# Patient Record
Sex: MALE | Race: OTHER | Employment: FULL TIME | URBAN - METROPOLITAN AREA
[De-identification: names, ages, dates, MRNs, and addresses within clinical notes are randomized per-mention and may not be internally consistent; named-entity substitution may affect disease eponyms.]

---

## 2020-08-12 ENCOUNTER — HOSPITAL ENCOUNTER (EMERGENCY)
Age: 60
Discharge: HOME | End: 2020-08-13
Attending: EMERGENCY MEDICINE
Payer: COMMERCIAL

## 2020-08-12 ENCOUNTER — APPOINTMENT (OUTPATIENT)
Dept: GENERAL RADIOLOGY | Age: 60
End: 2020-08-12
Payer: COMMERCIAL

## 2020-08-12 LAB
ALBUMIN SERPL-MCNC: 4.1 G/DL (ref 3.5–4.6)
ALP BLD-CCNC: 70 U/L (ref 35–104)
ALT SERPL-CCNC: 27 U/L (ref 0–41)
ANION GAP SERPL CALCULATED.3IONS-SCNC: 10 MEQ/L (ref 9–15)
APTT: 35.4 SEC (ref 24.4–36.8)
AST SERPL-CCNC: 21 U/L (ref 0–40)
BASOPHILS ABSOLUTE: 0 K/UL (ref 0–0.2)
BASOPHILS RELATIVE PERCENT: 0.6 %
BILIRUB SERPL-MCNC: 0.6 MG/DL (ref 0.2–0.7)
BILIRUBIN URINE: NEGATIVE
BLOOD, URINE: NEGATIVE
BUN BLDV-MCNC: 14 MG/DL (ref 8–23)
CALCIUM SERPL-MCNC: 8.5 MG/DL (ref 8.5–9.9)
CHLORIDE BLD-SCNC: 103 MEQ/L (ref 95–107)
CLARITY: CLEAR
CO2: 25 MEQ/L (ref 20–31)
COLOR: YELLOW
CREAT SERPL-MCNC: 1.03 MG/DL (ref 0.7–1.2)
D DIMER: 0.28 MG/L FEU (ref 0–0.5)
EKG ATRIAL RATE: 87 BPM
EKG P AXIS: 69 DEGREES
EKG P-R INTERVAL: 178 MS
EKG Q-T INTERVAL: 366 MS
EKG QRS DURATION: 86 MS
EKG QTC CALCULATION (BAZETT): 440 MS
EKG R AXIS: 2 DEGREES
EKG T AXIS: 6 DEGREES
EKG VENTRICULAR RATE: 87 BPM
EOSINOPHILS ABSOLUTE: 0.1 K/UL (ref 0–0.7)
EOSINOPHILS RELATIVE PERCENT: 1.9 %
GFR AFRICAN AMERICAN: >60
GFR NON-AFRICAN AMERICAN: >60
GLOBULIN: 2.6 G/DL (ref 2.3–3.5)
GLUCOSE BLD-MCNC: 110 MG/DL (ref 70–99)
GLUCOSE URINE: NEGATIVE MG/DL
HCT VFR BLD CALC: 41.1 % (ref 42–52)
HEMOGLOBIN: 14.3 G/DL (ref 14–18)
INR BLD: 1.1
KETONES, URINE: NEGATIVE MG/DL
LEUKOCYTE ESTERASE, URINE: NEGATIVE
LIPASE: 19 U/L (ref 12–95)
LYMPHOCYTES ABSOLUTE: 1.6 K/UL (ref 1–4.8)
LYMPHOCYTES RELATIVE PERCENT: 31.9 %
MCH RBC QN AUTO: 30.8 PG (ref 27–31.3)
MCHC RBC AUTO-ENTMCNC: 34.7 % (ref 33–37)
MCV RBC AUTO: 88.8 FL (ref 80–100)
MONOCYTES ABSOLUTE: 0.4 K/UL (ref 0.2–0.8)
MONOCYTES RELATIVE PERCENT: 8.7 %
NEUTROPHILS ABSOLUTE: 2.9 K/UL (ref 1.4–6.5)
NEUTROPHILS RELATIVE PERCENT: 56.9 %
NITRITE, URINE: NEGATIVE
PDW BLD-RTO: 13.1 % (ref 11.5–14.5)
PH UA: 7 (ref 5–9)
PLATELET # BLD: 239 K/UL (ref 130–400)
POTASSIUM REFLEX MAGNESIUM: 4.1 MEQ/L (ref 3.4–4.9)
PRO-BNP: 10 PG/ML
PROTEIN UA: NEGATIVE MG/DL
PROTHROMBIN TIME: 14 SEC (ref 12.3–14.9)
RBC # BLD: 4.62 M/UL (ref 4.7–6.1)
SODIUM BLD-SCNC: 138 MEQ/L (ref 135–144)
SPECIFIC GRAVITY UA: 1.01 (ref 1–1.03)
TOTAL PROTEIN: 6.7 G/DL (ref 6.3–8)
TROPONIN: <0.01 NG/ML (ref 0–0.01)
UROBILINOGEN, URINE: 0.2 E.U./DL
WBC # BLD: 5.1 K/UL (ref 4.8–10.8)

## 2020-08-12 PROCEDURE — 93005 ELECTROCARDIOGRAM TRACING: CPT | Performed by: EMERGENCY MEDICINE

## 2020-08-12 PROCEDURE — 81003 URINALYSIS AUTO W/O SCOPE: CPT

## 2020-08-12 PROCEDURE — 99285 EMERGENCY DEPT VISIT HI MDM: CPT

## 2020-08-12 PROCEDURE — 84484 ASSAY OF TROPONIN QUANT: CPT

## 2020-08-12 PROCEDURE — 2580000003 HC RX 258: Performed by: EMERGENCY MEDICINE

## 2020-08-12 PROCEDURE — 80053 COMPREHEN METABOLIC PANEL: CPT

## 2020-08-12 PROCEDURE — 83880 ASSAY OF NATRIURETIC PEPTIDE: CPT

## 2020-08-12 PROCEDURE — 85730 THROMBOPLASTIN TIME PARTIAL: CPT

## 2020-08-12 PROCEDURE — 85379 FIBRIN DEGRADATION QUANT: CPT

## 2020-08-12 PROCEDURE — 83690 ASSAY OF LIPASE: CPT

## 2020-08-12 PROCEDURE — 85025 COMPLETE CBC W/AUTO DIFF WBC: CPT

## 2020-08-12 PROCEDURE — 6370000000 HC RX 637 (ALT 250 FOR IP): Performed by: EMERGENCY MEDICINE

## 2020-08-12 PROCEDURE — 71045 X-RAY EXAM CHEST 1 VIEW: CPT

## 2020-08-12 PROCEDURE — 36415 COLL VENOUS BLD VENIPUNCTURE: CPT

## 2020-08-12 PROCEDURE — 85610 PROTHROMBIN TIME: CPT

## 2020-08-12 RX ORDER — 0.9 % SODIUM CHLORIDE 0.9 %
1000 INTRAVENOUS SOLUTION INTRAVENOUS ONCE
Status: COMPLETED | OUTPATIENT
Start: 2020-08-12 | End: 2020-08-12

## 2020-08-12 RX ORDER — NITROGLYCERIN 0.4 MG/1
0.4 TABLET SUBLINGUAL EVERY 5 MIN PRN
Status: DISCONTINUED | OUTPATIENT
Start: 2020-08-12 | End: 2020-08-13 | Stop reason: HOSPADM

## 2020-08-12 RX ORDER — ASPIRIN 81 MG/1
324 TABLET, CHEWABLE ORAL ONCE
Status: COMPLETED | OUTPATIENT
Start: 2020-08-12 | End: 2020-08-12

## 2020-08-12 RX ADMIN — SODIUM CHLORIDE 1000 ML: 9 INJECTION, SOLUTION INTRAVENOUS at 21:12

## 2020-08-12 RX ADMIN — ASPIRIN 324 MG: 81 TABLET, CHEWABLE ORAL at 21:12

## 2020-08-12 ASSESSMENT — PAIN DESCRIPTION - PAIN TYPE: TYPE: ACUTE PAIN

## 2020-08-12 ASSESSMENT — PAIN SCALES - GENERAL: PAINLEVEL_OUTOF10: 4

## 2020-08-13 VITALS
RESPIRATION RATE: 20 BRPM | HEART RATE: 64 BPM | TEMPERATURE: 98.3 F | WEIGHT: 195 LBS | DIASTOLIC BLOOD PRESSURE: 88 MMHG | BODY MASS INDEX: 30.61 KG/M2 | OXYGEN SATURATION: 98 % | SYSTOLIC BLOOD PRESSURE: 133 MMHG | HEIGHT: 67 IN

## 2020-08-13 LAB — TROPONIN: <0.01 NG/ML (ref 0–0.01)

## 2020-08-13 PROCEDURE — 93010 ELECTROCARDIOGRAM REPORT: CPT | Performed by: INTERNAL MEDICINE

## 2020-08-13 NOTE — ED TRIAGE NOTES
Patient states that a couple hours ago when he was sitting on the couch he felt discomfort in his left arm. He states that it became numb and tingly. He decided to go for a walk and started to have some discomfort on his left side along with slight dizziness. He is alert and orientated x 4. Pink, warm and dry. Abc's are intact. VSS. Afebrile. Will continue to monitor.

## 2020-08-13 NOTE — ED NOTES
Patient denies chest pain at this time, sublingual nitroglycerin not administered.       Raffaele Pascal RN  08/12/20 [id number]

## 2020-08-15 NOTE — ED PROVIDER NOTES
eMERGENCY dEPARTMENT eNCOUnter      [facility]    Chief Complaint   Patient presents with    Dizziness     left arm dizziness, left chest discomfort       HPI    Betsy Quinn is a 56 y.o. male who presentsto ED from home  By private car  With complaint of left side chest pain  Onset 6:30 PM  Intensity of symptoms mild  Location of symptoms left-sided chest,\" burning pressure\" lasted for few minutes with no aggravating or relieving factors. Patient also complains of lightheadedness, dizziness, headache. Patient has a history of heartburn and says it feels like the same. Patient denies any shortness of breath, cough, fever, chills. Patient denies any  recent contacts positive with corona virus or recent travel outside of [employer]. PAST MEDICAL HISTORY    Past Medical History:   Diagnosis Date    Hx of abdominal surgery      : Appendectomy (age 4)   [place] Hx of vasectomy        SURGICAL HISTORY    History reviewed. No pertinent surgical history. CURRENT MEDICATIONS    Current Outpatient Rx   Medication Sig Dispense Refill    omeprazole (PRILOSEC) 20 MG capsule Take 1 capsule by mouth Daily.  for stomach 60 capsule 2    omeprazole (PRILOSEC) 20 MG capsule TAKE 1 CAPSULE BY MOUTH DAILY FOR STOMACH 60 2       ALLERGIES    Allergies   Allergen Reactions    Latex Hives       FAMILY HISTORY    Family History   Problem Relation Age of Onset    Diabetes Sister        SOCIAL HISTORY    Social History     Socioeconomic History    Marital status:      Spouse name: None    Number of children: None    Years of education: None    Highest education level: None   Occupational History    None   Social Needs    Financial resource strain: None    Food insecurity     Worry: None     Inability: None    Transportation needs     Medical: None     Non-medical: None   Tobacco Use    Smoking status: Never Smoker    Smokeless tobacco: Never Used   Substance and Sexual Activity    Alcohol use: No    Drug use: No    Sexual activity: None   Lifestyle    Physical activity     Days per week: None     Minutes per session: None    Stress: None   Relationships    Social connections     Talks on phone: None     Gets together: None     Attends Yazidi service: None     Active member of club or organization: None     Attends meetings of clubs or organizations: None     Relationship status: None    Intimate partner violence     Fear of current or ex partner: None     Emotionally abused: None     Physically abused: None     Forced sexual activity: None   Other Topics Concern    None   Social History Narrative    None       REVIEW OF SYSTEMS    Constitutional:  Denies fever, chills, weight loss or weakness   Eyes:  Denies photophobia or discharge   HENT:  Denies sore throat or ear pain   Respiratory:  Denies cough or shortness of breath   Cardiovascular:  C/o  chest pain, but denies palpitations or swelling   GI:  Denies abdominal pain, nausea, vomiting, or diarrhea   Musculoskeletal:  Denies back pain   Skin:  Denies rash   Neurologic:  Denies  focal weakness or sensory changes but complains of lightheadedness  Endocrine:  Denies polyuria or polydypsia   Lymphatic:  Denies swollen glands   Psychiatric:  Denies depression, suicidal ideation or homicidal ideation   All systems negative except as marked. PHYSICAL EXAM    VITAL SIGNS: /88   Pulse 64   Temp 98.3 °F (36.8 °C) (Tympanic)   Resp 20   Ht 5' 7\" (1.702 m)   Wt 195 lb (88.5 kg)   SpO2 98%   BMI 30.54 kg/m²    Constitutional:  Well developed, Well nourished, No acute distress, Non-toxic appearance. HENT:  Normocephalic, Atraumatic, Bilateral external ears normal, Oropharynx moist, No oral exudates, Nose normal. Neck- Normal range of motion, No tenderness, Supple, No stridor. Eyes:  PERRL, EOMI, Conjunctiva normal, No discharge. Respiratory:  Normal breath sounds, No respiratory distress, No wheezing, No chest tenderness.    Cardiovascular: Normal heart rate, Normal rhythm, No murmurs, No rubs, No gallops. GI:  Bowel sounds normal, Soft, No tenderness, No masses, No pulsatile masses. :  No CVA tenderness. Musculoskeletal:  Intact distal pulses, No edema, No tenderness, No cyanosis, No clubbing. Good range of motion in all major joints. No tenderness to palpation or major deformities noted. Back- No tenderness. Integument:  Warm, Dry, No erythema, No rash. Lymphatic:  No lymphadenopathy noted. Neurologic:  Alert & oriented x 3, Normal motor function, Normal sensory function, No focal deficits noted. Psychiatric:  Affect normal, Judgment normal, Mood normal.     EKG    NSR, HR 87 , Normal Axis, No ST changes nonspecific T wave changes and 3, aVF , QTc 440, PAC    RADIOLOGY    XR CHEST PORTABLE   Final Result   Impression:  No radiographic evidence of acute cardiopulmonary disease             REEVALUATION   Patient was updated the results of labs and Radiology. Patient denies chest pain or shortness of breath. MDM: Patient had atypical presentation for chest pain. Patient's risk factors are minimal.  2 sets of troponin were negative.   Patient was discharged home with follow-up with cardiologist.        Jenni Ramirez Reviewed   CBC WITH AUTO DIFFERENTIAL - Abnormal; Notable for the following components:       Result Value    RBC 4.62 (*)     Hematocrit 41.1 (*)     All other components within normal limits   COMPREHENSIVE METABOLIC PANEL W/ REFLEX TO MG FOR LOW K - Abnormal; Notable for the following components:    Glucose 110 (*)     All other components within normal limits   TROPONIN   BRAIN NATRIURETIC PEPTIDE   PROTIME-INR   APTT   D-DIMER, QUANTITATIVE   URINALYSIS   LIPASE   TROPONIN             Summation      Patient Course:     ED Medications administered this visit:    Medications   0.9 % sodium chloride bolus (0 mLs Intravenous Stopped 8/12/20 2230)   aspirin chewable tablet 324 mg (324 mg Oral Given 8/12/20 2112)       New Prescriptions from this visit:    Discharge Medication List as of 8/13/2020 12:53 AM          Follow-up:  Faye Lobo, [address] [facility] [address]  [facility] [id number] [phone]    Call in 1 day      [address] [place] [address]  [address] [facility]     Call in 1 day          Final Impression:   1.  Chest pain, unspecified type               (Please note that portions of this note were completed with a voice recognition program.  Efforts were made to edit the dictations but occasionally words are mis-transcribed.)            Nickie Mesa MD  08/15/20 8687

## 2023-05-27 ENCOUNTER — HOSPITAL ENCOUNTER (EMERGENCY)
Age: 63
Discharge: HOME | End: 2023-05-27
Attending: EMERGENCY MEDICINE
Payer: COMMERCIAL

## 2023-05-27 ENCOUNTER — APPOINTMENT (OUTPATIENT)
Dept: GENERAL RADIOLOGY | Age: 63
End: 2023-05-27
Payer: COMMERCIAL

## 2023-05-27 VITALS
BODY MASS INDEX: 30.54 KG/M2 | RESPIRATION RATE: 17 BRPM | HEART RATE: 65 BPM | WEIGHT: 195 LBS | OXYGEN SATURATION: 97 % | SYSTOLIC BLOOD PRESSURE: 133 MMHG | DIASTOLIC BLOOD PRESSURE: 105 MMHG | TEMPERATURE: 97.4 F

## 2023-05-27 DIAGNOSIS — R07.89 ATYPICAL CHEST PAIN: Primary | ICD-10-CM

## 2023-05-27 LAB
ALBUMIN SERPL-MCNC: 4.2 G/DL (ref 3.5–4.6)
ALP SERPL-CCNC: 72 U/L (ref 35–104)
ALT SERPL-CCNC: 37 U/L (ref 0–41)
ANION GAP SERPL CALCULATED.3IONS-SCNC: 11 MEQ/L (ref 9–15)
AST SERPL-CCNC: 41 U/L (ref 0–40)
BASOPHILS # BLD: 0 K/UL (ref 0–0.2)
BASOPHILS NFR BLD: 0.5 %
BILIRUB SERPL-MCNC: 0.8 MG/DL (ref 0.2–0.7)
BUN SERPL-MCNC: 12 MG/DL (ref 8–23)
CALCIUM SERPL-MCNC: 8.7 MG/DL (ref 8.5–9.9)
CHLORIDE SERPL-SCNC: 103 MEQ/L (ref 95–107)
CO2 SERPL-SCNC: 20 MEQ/L (ref 20–31)
CREAT SERPL-MCNC: 1.05 MG/DL (ref 0.7–1.2)
EKG ATRIAL RATE: 67 BPM
EKG P AXIS: 43 DEGREES
EKG P-R INTERVAL: 178 MS
EKG Q-T INTERVAL: 386 MS
EKG QRS DURATION: 84 MS
EKG QTC CALCULATION (BAZETT): 407 MS
EKG R AXIS: -8 DEGREES
EKG T AXIS: -4 DEGREES
EKG VENTRICULAR RATE: 67 BPM
EOSINOPHIL # BLD: 0.1 K/UL (ref 0–0.7)
EOSINOPHIL NFR BLD: 2 %
ERYTHROCYTE [DISTWIDTH] IN BLOOD BY AUTOMATED COUNT: 13.3 % (ref 11.5–14.5)
GLOBULIN SER CALC-MCNC: 2.8 G/DL (ref 2.3–3.5)
GLUCOSE SERPL-MCNC: 93 MG/DL (ref 70–99)
HCT VFR BLD AUTO: 44.5 % (ref 42–52)
HGB BLD-MCNC: 15 G/DL (ref 14–18)
LYMPHOCYTES # BLD: 1.5 K/UL (ref 1–4.8)
LYMPHOCYTES NFR BLD: 30.3 %
MCH RBC QN AUTO: 29.8 PG (ref 27–31.3)
MCHC RBC AUTO-ENTMCNC: 33.8 % (ref 33–37)
MCV RBC AUTO: 88.3 FL (ref 79–92.2)
MONOCYTES # BLD: 0.5 K/UL (ref 0.2–0.8)
MONOCYTES NFR BLD: 9.7 %
NEUTROPHILS # BLD: 2.8 K/UL (ref 1.4–6.5)
NEUTS SEG NFR BLD: 57.5 %
PLATELET # BLD AUTO: 249 K/UL (ref 130–400)
POTASSIUM SERPL-SCNC: 5 MEQ/L (ref 3.4–4.9)
PROT SERPL-MCNC: 7 G/DL (ref 6.3–8)
RBC # BLD AUTO: 5.04 M/UL (ref 4.7–6.1)
SODIUM SERPL-SCNC: 134 MEQ/L (ref 135–144)
TROPONIN T SERPL-MCNC: <0.01 NG/ML (ref 0–0.01)
WBC # BLD AUTO: 4.9 K/UL (ref 4.8–10.8)

## 2023-05-27 PROCEDURE — 93005 ELECTROCARDIOGRAM TRACING: CPT | Performed by: EMERGENCY MEDICINE

## 2023-05-27 PROCEDURE — 99285 EMERGENCY DEPT VISIT HI MDM: CPT

## 2023-05-27 PROCEDURE — 80053 COMPREHEN METABOLIC PANEL: CPT

## 2023-05-27 PROCEDURE — 6360000002 HC RX W HCPCS: Performed by: EMERGENCY MEDICINE

## 2023-05-27 PROCEDURE — 96374 THER/PROPH/DIAG INJ IV PUSH: CPT

## 2023-05-27 PROCEDURE — 85025 COMPLETE CBC W/AUTO DIFF WBC: CPT

## 2023-05-27 PROCEDURE — 84484 ASSAY OF TROPONIN QUANT: CPT

## 2023-05-27 PROCEDURE — 36415 COLL VENOUS BLD VENIPUNCTURE: CPT

## 2023-05-27 PROCEDURE — 71045 X-RAY EXAM CHEST 1 VIEW: CPT

## 2023-05-27 RX ORDER — KETOROLAC TROMETHAMINE 30 MG/ML
30 INJECTION, SOLUTION INTRAMUSCULAR; INTRAVENOUS ONCE
Status: COMPLETED | OUTPATIENT
Start: 2023-05-27 | End: 2023-05-27

## 2023-05-27 RX ADMIN — KETOROLAC TROMETHAMINE 30 MG: 30 INJECTION, SOLUTION INTRAMUSCULAR; INTRAVENOUS at 11:35

## 2023-05-27 ASSESSMENT — LIFESTYLE VARIABLES
HOW MANY STANDARD DRINKS CONTAINING ALCOHOL DO YOU HAVE ON A TYPICAL DAY: PATIENT DOES NOT DRINK
HOW OFTEN DO YOU HAVE A DRINK CONTAINING ALCOHOL: NEVER

## 2023-05-27 ASSESSMENT — PAIN DESCRIPTION - DESCRIPTORS
DESCRIPTORS: ACHING
DESCRIPTORS: SHOOTING
DESCRIPTORS: SQUEEZING

## 2023-05-27 ASSESSMENT — PAIN DESCRIPTION - LOCATION
LOCATION: CHEST

## 2023-05-27 ASSESSMENT — PAIN DESCRIPTION - ORIENTATION
ORIENTATION: LEFT

## 2023-05-27 ASSESSMENT — PAIN - FUNCTIONAL ASSESSMENT: PAIN_FUNCTIONAL_ASSESSMENT: 0-10

## 2023-05-27 ASSESSMENT — ENCOUNTER SYMPTOMS
NAUSEA: 0
CHEST TIGHTNESS: 0
EYE PAIN: 0
SORE THROAT: 0
VOMITING: 0
SHORTNESS OF BREATH: 0
ABDOMINAL PAIN: 0

## 2023-05-27 ASSESSMENT — PAIN SCALES - GENERAL
PAINLEVEL_OUTOF10: 7
PAINLEVEL_OUTOF10: 3
PAINLEVEL_OUTOF10: 7

## 2023-05-27 ASSESSMENT — PAIN DESCRIPTION - PAIN TYPE: TYPE: ACUTE PAIN

## 2023-05-27 NOTE — ED PROVIDER NOTES
[address] ED  EMERGENCY DEPARTMENT ENCOUNTER      Pt Name: Supa Green  MRN: [id number]  [place] 1960  Date of evaluation: 5/27/2023  Provider: Yandel Gonzalez, [facility]       Chief Complaint   Patient presents with    Chest Pain     Left side          HISTORY OF PRESENT ILLNESS   (Location/Symptom, Timing/Onset, Context/Setting, Quality, Duration, Modifying Factors, Severity)  Note limiting factors. Supa Green is a 70 y.o. male who presents to the emergency department . Patient presents with some pain under his left breast region that seems to be worse when he moves his arm a certain way. He was at [employer] at the time. He then felt a little bit lightheaded and decided to come to the ER to be evaluated. He is no longer lightheaded. The chest pain is not there right now and is not associated with any other symptoms such as shortness of breath diaphoresis. Patient has no history of heart problems. Did not eat breakfast today but he states that he never eats breakfast before going to [employer]. He has been eating and drinking well. No recent travel or long car rides. No swelling of his legs. HPI    Nursing Notes were reviewed. REVIEW OF SYSTEMS    (2-9 systems for level 4, 10 or more for level 5)     Review of Systems   Constitutional:  Negative for activity change, appetite change and fatigue. HENT:  Negative for congestion and sore throat. Eyes:  Negative for pain and visual disturbance. Respiratory:  Negative for chest tightness and shortness of breath. Cardiovascular:  Positive for chest pain. Gastrointestinal:  Negative for abdominal pain, nausea and vomiting. Endocrine: Negative for polydipsia. Genitourinary:  Negative for flank pain and urgency. Musculoskeletal:  Negative for gait problem and neck stiffness. Skin:  Negative for rash. Neurological:  Positive for light-headedness. Negative for weakness and headaches.

## 2023-05-30 LAB
EKG ATRIAL RATE: 67 BPM
EKG P AXIS: 43 DEGREES
EKG P-R INTERVAL: 178 MS
EKG Q-T INTERVAL: 386 MS
EKG QRS DURATION: 84 MS
EKG QTC CALCULATION (BAZETT): 407 MS
EKG R AXIS: -8 DEGREES
EKG T AXIS: -4 DEGREES
EKG VENTRICULAR RATE: 67 BPM

## 2023-05-30 PROCEDURE — 93010 ELECTROCARDIOGRAM REPORT: CPT | Performed by: INTERNAL MEDICINE

## 2023-06-02 ENCOUNTER — OFFICE VISIT (OUTPATIENT)
Dept: PRIMARY CARE | Facility: CLINIC | Age: 63
End: 2023-06-02
Payer: COMMERCIAL

## 2023-06-02 VITALS — BODY MASS INDEX: 30.7 KG/M2 | SYSTOLIC BLOOD PRESSURE: 124 MMHG | WEIGHT: 196 LBS | DIASTOLIC BLOOD PRESSURE: 84 MMHG

## 2023-06-02 DIAGNOSIS — M25.522 LEFT ELBOW PAIN: Primary | ICD-10-CM

## 2023-06-02 DIAGNOSIS — R07.89 ANTERIOR CHEST WALL PAIN: ICD-10-CM

## 2023-06-02 DIAGNOSIS — Z12.5 PROSTATE CANCER SCREENING: ICD-10-CM

## 2023-06-02 DIAGNOSIS — Z80.42 FAMILY HISTORY OF PROSTATE CANCER: ICD-10-CM

## 2023-06-02 LAB — PROSTATE SPECIFIC AG (NG/ML) IN SER/PLAS: 0.93 NG/ML (ref 0–4)

## 2023-06-02 PROCEDURE — 99214 OFFICE O/P EST MOD 30 MIN: CPT | Performed by: FAMILY MEDICINE

## 2023-06-02 PROCEDURE — 1036F TOBACCO NON-USER: CPT | Performed by: FAMILY MEDICINE

## 2023-06-02 PROCEDURE — 84153 ASSAY OF PSA TOTAL: CPT

## 2023-06-02 PROCEDURE — 1160F RVW MEDS BY RX/DR IN RCRD: CPT | Performed by: FAMILY MEDICINE

## 2023-06-02 PROCEDURE — 1159F MED LIST DOCD IN RCRD: CPT | Performed by: FAMILY MEDICINE

## 2023-06-02 ASSESSMENT — PATIENT HEALTH QUESTIONNAIRE - PHQ9
SUM OF ALL RESPONSES TO PHQ9 QUESTIONS 1 AND 2: 0
2. FEELING DOWN, DEPRESSED OR HOPELESS: NOT AT ALL
1. LITTLE INTEREST OR PLEASURE IN DOING THINGS: NOT AT ALL

## 2023-06-02 NOTE — PROGRESS NOTES
Subjective   Patient ID: Doug Yan Sr. is a 63 y.o. male who presents for Hospital Follow-up (Feeling better.) and Elbow Pain (Left elbow hurts for months now. ).    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      Heart sounds: Normal heart sounds.   Pulmonary:      Effort: Pulmonary effort is normal.      Breath sounds: Normal breath sounds.   Musculoskeletal:      Comments: No anterior chest wall tenderness on exam  No left shoulder pain appreciated on palpation  Pos left lateral epicondyle tenderness with palpation, no edema, no erythema   Normal reflexes,  Good  strength with left hand          Assessment/Plan   Diagnoses and all orders for this visit:  Left elbow pain  Anterior chest wall pain  Prostate cancer screening  -     PSA  Family history of prostate cancer    Anterior chest wall pain has been improving per pt      Trial of topical voltaren gel for his left elbow  Also recc a neoprene sleeve for his left elbow  Advised pt to call with any new or worsening symptoms    Marina Serrato DO

## 2023-06-04 ENCOUNTER — TELEPHONE (OUTPATIENT)
Dept: PRIMARY CARE | Facility: CLINIC | Age: 63
End: 2023-06-04
Payer: COMMERCIAL

## 2023-06-04 NOTE — TELEPHONE ENCOUNTER
Please notify pt that his PSA was WNL, we should plan to check this annually and track his values each year.    Thank you,  Marina Serrato, DO

## 2023-06-05 NOTE — TELEPHONE ENCOUNTER
Unable to reach patient. Left detailed message regarding lab results and to call the office back with any questions or concerns.

## 2023-07-25 ENCOUNTER — TELEPHONE (OUTPATIENT)
Dept: PRIMARY CARE | Facility: CLINIC | Age: 63
End: 2023-07-25
Payer: COMMERCIAL

## 2023-07-25 DIAGNOSIS — Z12.11 COLON CANCER SCREENING: Primary | ICD-10-CM

## 2023-07-25 NOTE — TELEPHONE ENCOUNTER
Pt is due for his colonoscopy. According to his chart, I do not see that he has had one.    Please let me know when this is ordered so I can call pt.

## 2023-08-26 PROBLEM — D34 THYROID FOLLICULAR ADENOMA: Status: ACTIVE | Noted: 2023-08-26

## 2023-08-26 PROBLEM — E04.2 MULTIPLE THYROID NODULES: Status: ACTIVE | Noted: 2023-08-26

## 2023-08-26 PROBLEM — R79.89 ABNORMAL LFTS: Status: ACTIVE | Noted: 2023-08-26

## 2023-08-26 PROBLEM — E66.9 OBESITY WITH BODY MASS INDEX 30 OR GREATER: Status: ACTIVE | Noted: 2023-08-26

## 2023-08-26 PROBLEM — R22.2 SUBCUTANEOUS MASS OF BACK: Status: ACTIVE | Noted: 2023-08-26

## 2023-08-26 PROBLEM — R93.1 AGATSTON CORONARY ARTERY CALCIUM SCORE LESS THAN 100: Status: ACTIVE | Noted: 2023-08-26

## 2023-08-26 PROBLEM — R05.9 COUGH: Status: ACTIVE | Noted: 2023-08-26

## 2023-08-26 PROBLEM — R20.2 PARESTHESIA AND PAIN OF EXTREMITY: Status: ACTIVE | Noted: 2023-08-26

## 2023-08-26 PROBLEM — M72.2 PLANTAR FASCIITIS: Status: ACTIVE | Noted: 2023-08-26

## 2023-08-26 PROBLEM — D17.30 LIPOMA OF SKIN AND SUBCUTANEOUS TISSUE: Status: ACTIVE | Noted: 2023-08-26

## 2023-08-26 PROBLEM — R07.89 ATYPICAL CHEST PAIN: Status: ACTIVE | Noted: 2023-08-26

## 2023-08-26 PROBLEM — I88.9 LYMPHADENITIS: Status: ACTIVE | Noted: 2023-08-26

## 2023-08-26 PROBLEM — M54.2 NECK PAIN: Status: ACTIVE | Noted: 2023-08-26

## 2023-08-26 PROBLEM — M79.609 PARESTHESIA AND PAIN OF EXTREMITY: Status: ACTIVE | Noted: 2023-08-26

## 2023-08-26 PROBLEM — M89.8X8 MASS OF SPINE: Status: ACTIVE | Noted: 2023-08-26

## 2023-08-26 PROBLEM — R42 DIZZINESS: Status: ACTIVE | Noted: 2023-08-26

## 2023-08-26 PROBLEM — S20.219A CHEST WALL CONTUSION: Status: ACTIVE | Noted: 2023-08-26

## 2023-08-26 PROBLEM — E78.5 HYPERLIPIDEMIA: Status: ACTIVE | Noted: 2023-08-26

## 2023-08-26 PROBLEM — E04.1 THYROID NODULE: Status: ACTIVE | Noted: 2023-08-26

## 2023-08-26 PROBLEM — I49.1 PREMATURE ATRIAL COMPLEXES: Status: ACTIVE | Noted: 2023-08-26

## 2023-08-26 PROBLEM — I49.9 IRREGULAR HEART BEATS: Status: ACTIVE | Noted: 2023-08-26

## 2023-08-26 PROBLEM — J06.9 UPPER RESPIRATORY INFECTION: Status: ACTIVE | Noted: 2023-08-26

## 2023-08-26 PROBLEM — M54.9 MID BACK PAIN: Status: ACTIVE | Noted: 2023-08-26

## 2023-08-26 PROBLEM — E66.9 OBESITY (BMI 30-39.9): Status: ACTIVE | Noted: 2023-08-26

## 2023-08-26 PROBLEM — R59.0 CERVICAL LYMPHADENOPATHY: Status: ACTIVE | Noted: 2023-08-26

## 2023-08-26 PROBLEM — L90.5 SCAR OF BACK: Status: ACTIVE | Noted: 2023-08-26

## 2023-10-04 ENCOUNTER — OFFICE VISIT (OUTPATIENT)
Dept: GASTROENTEROLOGY | Facility: CLINIC | Age: 63
End: 2023-10-04
Payer: COMMERCIAL

## 2023-10-04 VITALS
HEART RATE: 68 BPM | DIASTOLIC BLOOD PRESSURE: 87 MMHG | BODY MASS INDEX: 31.48 KG/M2 | SYSTOLIC BLOOD PRESSURE: 135 MMHG | WEIGHT: 201 LBS

## 2023-10-04 DIAGNOSIS — K63.5 POLYP OF COLON, UNSPECIFIED PART OF COLON, UNSPECIFIED TYPE: Primary | ICD-10-CM

## 2023-10-04 PROCEDURE — 1036F TOBACCO NON-USER: CPT | Performed by: INTERNAL MEDICINE

## 2023-10-04 PROCEDURE — 1160F RVW MEDS BY RX/DR IN RCRD: CPT | Performed by: INTERNAL MEDICINE

## 2023-10-04 PROCEDURE — 1159F MED LIST DOCD IN RCRD: CPT | Performed by: INTERNAL MEDICINE

## 2023-10-04 PROCEDURE — 99203 OFFICE O/P NEW LOW 30 MIN: CPT | Performed by: INTERNAL MEDICINE

## 2023-10-04 ASSESSMENT — ENCOUNTER SYMPTOMS
GASTROINTESTINAL NEGATIVE: 1
EYES NEGATIVE: 1
CARDIOVASCULAR NEGATIVE: 1
CONSTITUTIONAL NEGATIVE: 1
RESPIRATORY NEGATIVE: 1
PSYCHIATRIC NEGATIVE: 1
ALLERGIC/IMMUNOLOGIC NEGATIVE: 1
NEUROLOGICAL NEGATIVE: 1
HEMATOLOGIC/LYMPHATIC NEGATIVE: 1
MUSCULOSKELETAL NEGATIVE: 1
ENDOCRINE NEGATIVE: 1

## 2023-10-04 NOTE — PROGRESS NOTES
Subjective     History of Present Illness:   Doug Yan Sr. is a 63 y.o. male who presents to GI clinic for colonoscopy screening.  He had 3 total colonoscopies in the past.  He states the first 1 he had 6 polyps removed.  He states his second and third colonoscopy had no polyps.  He thinks has been over 5 years since his last colonoscopy this was likely done at [facility].  We do not have records of this.  Denies any family history of colon cancer.  Denies any symptoms of constipation diarrhea blood in the stool or abdominal pain.  He is not on any blood thinners.    He does have a history of GERD and takes omeprazole as needed.  He states that only when he eats pizza or greasy or spicy food he will get GERD.  He does not have this on a daily basis.  He has never had endoscopy done before.  However he has never had daily GERD symptoms dysphagia odynophagia.       Review of Systems  Review of Systems   Constitutional: Negative.    HENT: Negative.     Eyes: Negative.    Respiratory: Negative.     Cardiovascular: Negative.    Gastrointestinal: Negative.    Endocrine: Negative.    Genitourinary: Negative.    Musculoskeletal: Negative.    Skin: Negative.    Allergic/Immunologic: Negative.    Neurological: Negative.    Hematological: Negative.    Psychiatric/Behavioral: Negative.         Past Medical History   has a past medical history of Colon polyp and GERD (gastroesophageal reflux disease).     Social History   reports that he has never smoked. He has never used smokeless tobacco. He reports that he does not use drugs.     Family History  family history includes Hypertension in his brother; No Known Problems in his father.     Allergies  Allergies   Allergen Reactions    Latex Hives       Medications  Current Outpatient Medications   Medication Instructions    PSEUDOEPHEDRINE-ACETAMINOPHEN ORAL oral, As needed        Objective   Visit Vitals  /87   Pulse 68      Physical Exam  Constitutional:       Appearance:  Normal appearance.   HENT:      Nose: Nose normal.   Eyes:      Extraocular Movements: Extraocular movements intact.   Cardiovascular:      Rate and Rhythm: Normal rate and regular rhythm.   Pulmonary:      Breath sounds: Normal breath sounds.   Abdominal:      General: Abdomen is flat. Bowel sounds are normal.      Palpations: Abdomen is soft.   Musculoskeletal:      Cervical back: Normal range of motion and neck supple.   Skin:     General: Skin is warm.   Neurological:      General: No focal deficit present.      Mental Status: He is alert and oriented to person, place, and time.   Psychiatric:         Mood and Affect: Mood normal.         Thought Content: Thought content normal.             Assessment/Plan   Doug Yan Sr. is a 63 y.o. male who presents to GI clinic for surveillance colonoscopy.  He has had history of colon polyps on his first colonoscopy subsequent colonoscopies x2 have had no polyps.  Has been 5+ years since his last colonoscopy and here for screening.  He does have occasional symptoms of GERD but not chronic GERD on a daily basis.  We discussed the need for endoscopy would like to hold off since he does not have daily symptoms of GERD    Plan:  Recommend colonoscopy for colon cancer screening and history of polyps  .        Ashley Cuevas MD

## 2023-10-23 ENCOUNTER — TELEPHONE (OUTPATIENT)
Dept: GASTROENTEROLOGY | Facility: CLINIC | Age: 63
End: 2023-10-23
Payer: COMMERCIAL

## 2023-10-23 NOTE — TELEPHONE ENCOUNTER
Patient called on 10/22/23 and left a voice mail, as the office was closed. He has not received his prep yet, he is scheduled for his colonoscopy on 10/25/23, with Dr Ervin. Please contact patient to verify if and where prep was sent and resend if necessary, patient mentioned in message a different pharmacy then what is listed. Patient must start prep tomorrow, so perhaps he could  a prep in office today. Let me know if I can assist in this.

## 2023-10-25 ENCOUNTER — ANESTHESIA (OUTPATIENT)
Dept: GASTROENTEROLOGY | Facility: HOSPITAL | Age: 63
End: 2023-10-25
Payer: COMMERCIAL

## 2023-10-25 ENCOUNTER — HOSPITAL ENCOUNTER (OUTPATIENT)
Dept: GASTROENTEROLOGY | Facility: HOSPITAL | Age: 63
Setting detail: OUTPATIENT SURGERY
Discharge: HOME | End: 2023-10-25
Payer: COMMERCIAL

## 2023-10-25 ENCOUNTER — ANESTHESIA EVENT (OUTPATIENT)
Dept: GASTROENTEROLOGY | Facility: HOSPITAL | Age: 63
End: 2023-10-25
Payer: COMMERCIAL

## 2023-10-25 VITALS
RESPIRATION RATE: 18 BRPM | HEART RATE: 64 BPM | OXYGEN SATURATION: 99 % | DIASTOLIC BLOOD PRESSURE: 90 MMHG | TEMPERATURE: 97.2 F | SYSTOLIC BLOOD PRESSURE: 143 MMHG

## 2023-10-25 DIAGNOSIS — Z12.11 ENCOUNTER FOR SCREENING FOR MALIGNANT NEOPLASM OF COLON: Primary | ICD-10-CM

## 2023-10-25 PROCEDURE — 3700000001 HC GENERAL ANESTHESIA TIME - INITIAL BASE CHARGE

## 2023-10-25 PROCEDURE — 7100000010 HC PHASE TWO TIME - EACH INCREMENTAL 1 MINUTE

## 2023-10-25 PROCEDURE — A45385 PR COLONOSCOPY,REMV LESN,SNARE: Performed by: ANESTHESIOLOGIST ASSISTANT

## 2023-10-25 PROCEDURE — 7100000009 HC PHASE TWO TIME - INITIAL BASE CHARGE

## 2023-10-25 PROCEDURE — 2500000004 HC RX 250 GENERAL PHARMACY W/ HCPCS (ALT 636 FOR OP/ED): Performed by: ANESTHESIOLOGY

## 2023-10-25 PROCEDURE — 2720000007 HC OR 272 NO HCPCS

## 2023-10-25 PROCEDURE — 88305 TISSUE EXAM BY PATHOLOGIST: CPT | Performed by: PATHOLOGY

## 2023-10-25 PROCEDURE — 2500000005 HC RX 250 GENERAL PHARMACY W/O HCPCS: Performed by: ANESTHESIOLOGIST ASSISTANT

## 2023-10-25 PROCEDURE — 2500000004 HC RX 250 GENERAL PHARMACY W/ HCPCS (ALT 636 FOR OP/ED): Performed by: ANESTHESIOLOGIST ASSISTANT

## 2023-10-25 PROCEDURE — 88305 TISSUE EXAM BY PATHOLOGIST: CPT | Mod: TC | Performed by: INTERNAL MEDICINE

## 2023-10-25 PROCEDURE — 45385 COLONOSCOPY W/LESION REMOVAL: CPT | Performed by: INTERNAL MEDICINE

## 2023-10-25 PROCEDURE — 3700000002 HC GENERAL ANESTHESIA TIME - EACH INCREMENTAL 1 MINUTE

## 2023-10-25 PROCEDURE — 88305 TISSUE EXAM BY PATHOLOGIST: CPT | Mod: TC,SUR | Performed by: INTERNAL MEDICINE

## 2023-10-25 PROCEDURE — 2500000004 HC RX 250 GENERAL PHARMACY W/ HCPCS (ALT 636 FOR OP/ED): Performed by: INTERNAL MEDICINE

## 2023-10-25 PROCEDURE — A45385 PR COLONOSCOPY,REMV LESN,SNARE: Performed by: ANESTHESIOLOGY

## 2023-10-25 RX ORDER — LIDOCAINE HCL/PF 100 MG/5ML
SYRINGE (ML) INTRAVENOUS AS NEEDED
Status: DISCONTINUED | OUTPATIENT
Start: 2023-10-25 | End: 2023-10-25

## 2023-10-25 RX ORDER — SODIUM CHLORIDE, SODIUM LACTATE, POTASSIUM CHLORIDE, CALCIUM CHLORIDE 600; 310; 30; 20 MG/100ML; MG/100ML; MG/100ML; MG/100ML
20 INJECTION, SOLUTION INTRAVENOUS CONTINUOUS
Status: DISCONTINUED | OUTPATIENT
Start: 2023-10-25 | End: 2023-10-26 | Stop reason: HOSPADM

## 2023-10-25 RX ORDER — PROPOFOL 10 MG/ML
INJECTION, EMULSION INTRAVENOUS CONTINUOUS PRN
Status: DISCONTINUED | OUTPATIENT
Start: 2023-10-25 | End: 2023-10-25

## 2023-10-25 RX ORDER — PROPOFOL 10 MG/ML
INJECTION, EMULSION INTRAVENOUS AS NEEDED
Status: DISCONTINUED | OUTPATIENT
Start: 2023-10-25 | End: 2023-10-25

## 2023-10-25 RX ADMIN — PROPOFOL 20 MG: 10 INJECTION, EMULSION INTRAVENOUS at 13:29

## 2023-10-25 RX ADMIN — LIDOCAINE HYDROCHLORIDE 35 MG: 20 INJECTION INTRAVENOUS at 13:00

## 2023-10-25 RX ADMIN — LIDOCAINE HYDROCHLORIDE 100 MG: 20 INJECTION INTRAVENOUS at 13:01

## 2023-10-25 RX ADMIN — PROPOFOL 20 MG: 10 INJECTION, EMULSION INTRAVENOUS at 13:21

## 2023-10-25 RX ADMIN — PROPOFOL 50 MG: 10 INJECTION, EMULSION INTRAVENOUS at 13:00

## 2023-10-25 RX ADMIN — PROPOFOL 20.7 MG: 10 INJECTION, EMULSION INTRAVENOUS at 13:40

## 2023-10-25 RX ADMIN — SODIUM CHLORIDE, SODIUM LACTATE, POTASSIUM CHLORIDE, AND CALCIUM CHLORIDE: .6; .31; .03; .02 INJECTION, SOLUTION INTRAVENOUS at 13:00

## 2023-10-25 RX ADMIN — PROPOFOL 20 MG: 10 INJECTION, EMULSION INTRAVENOUS at 13:38

## 2023-10-25 RX ADMIN — PROPOFOL 100 MCG/KG/MIN: 10 INJECTION, EMULSION INTRAVENOUS at 13:00

## 2023-10-25 RX ADMIN — PROPOFOL 50 MG: 10 INJECTION, EMULSION INTRAVENOUS at 13:01

## 2023-10-25 RX ADMIN — SODIUM CHLORIDE, POTASSIUM CHLORIDE, SODIUM LACTATE AND CALCIUM CHLORIDE 20 ML/HR: 600; 310; 30; 20 INJECTION, SOLUTION INTRAVENOUS at 12:36

## 2023-10-25 SDOH — HEALTH STABILITY: MENTAL HEALTH: CURRENT SMOKER: 0

## 2023-10-25 ASSESSMENT — COLUMBIA-SUICIDE SEVERITY RATING SCALE - C-SSRS
2. HAVE YOU ACTUALLY HAD ANY THOUGHTS OF KILLING YOURSELF?: NO
1. IN THE PAST MONTH, HAVE YOU WISHED YOU WERE DEAD OR WISHED YOU COULD GO TO SLEEP AND NOT WAKE UP?: NO
6. HAVE YOU EVER DONE ANYTHING, STARTED TO DO ANYTHING, OR PREPARED TO DO ANYTHING TO END YOUR LIFE?: NO

## 2023-10-25 ASSESSMENT — PAIN SCALES - GENERAL
PAINLEVEL_OUTOF10: 0 - NO PAIN

## 2023-10-25 ASSESSMENT — PAIN - FUNCTIONAL ASSESSMENT: PAIN_FUNCTIONAL_ASSESSMENT: 0-10

## 2023-10-25 NOTE — ANESTHESIA PREPROCEDURE EVALUATION
Patient: Doug Yan Sr.    Procedure Information       Date/Time: 10/25/23 1300    Scheduled providers: Raegan Ervin MD; Mark Jimenez MD; GINA Johnson; Roxy Lauren RN    Procedure: COLONOSCOPY    Location: [facility]            Relevant Problems   Cardiovascular   (+) Atypical chest pain   (+) Hyperlipidemia   (+) Premature atrial complexes      Endocrine   (+) Multiple thyroid nodules      GI   (+) GERD (gastroesophageal reflux disease)      Infectious Disease   (+) Upper respiratory infection      Other   (+) Cervical lymphadenopathy   (+) Lymphadenitis       Clinical information reviewed:   Tobacco  Allergies  Meds   Med Hx  Surg Hx   Fam Hx  Soc Hx        NPO Detail:  NPO/Void Status  Date of Last Liquid: 10/24/23  Time of Last Liquid: 2200  Date of Last Solid: 10/24/23  Time of Last Solid: 2200         Physical Exam    Airway  Mallampati: II  TM distance: >3 FB  Neck ROM: full     Cardiovascular   Rhythm: regular  Rate: normal     Dental    Pulmonary   Breath sounds clear to auscultation     Abdominal          Anesthesia Plan    ASA 2     MAC     The patient is not a current smoker.  Patient was not previously instructed to abstain from smoking on day of procedure.  Patient did not smoke on day of procedure.    intravenous induction   Anesthetic plan and risks discussed with patient.  Use of blood products discussed with patient who.    Plan discussed with GINA.

## 2023-10-25 NOTE — ANESTHESIA POSTPROCEDURE EVALUATION
Patient: Doug Yan .    Procedure Summary       Date: 10/25/23 Room / Location: [facility]    Anesthesia Start: 1300 Anesthesia Stop:     Procedure: COLONOSCOPY Diagnosis:       Encounter for screening for malignant neoplasm of colon      Encounter for screening for malignant neoplasm of colon    Scheduled Providers: Raegan Ervin MD; Mark Jimenez MD; GINA Johnson; Roxy Lauren RN Responsible Provider: Mark Jimenez MD    Anesthesia Type: MAC ASA Status: 2            Anesthesia Type: MAC    Vitals Value Taken Time   /82 10/25/23 1357   Temp 36.2 10/25/23 1357   Pulse 57 10/25/23 1357   Resp 16 10/25/23 1357   SpO2 99 10/25/23 1357       Anesthesia Post Evaluation    Patient location during evaluation: bedside  Patient participation: complete - patient participated  Level of consciousness: awake and alert  Pain score: 0  Pain management: adequate  Airway patency: patent  Cardiovascular status: acceptable  Respiratory status: acceptable  Hydration status: acceptable    No notable events documented.

## 2023-10-25 NOTE — DISCHARGE INSTRUCTIONS
Patient Instructions after a Colonoscopy      The anesthetics, sedatives or narcotics which were given to you today will be acting in your body for the next 24 hours, so you might feel a little sleepy or groggy.  This feeling should slowly wear off. Carefully read and follow the instructions.     You received sedation today:  - Do not drive or operate any machinery or power tools of any kind.   - No alcoholic beverages today, not even beer or wine.  - Do not make any important decisions or sign any legal documents.  - No over the counter medications that contain alcohol or that may cause drowsiness.  - Do not make any important decisions or sign any legal documents.    While it is common to experience mild to moderate abdominal distention, gas, or belching after your procedure, if any of these symptoms occur following discharge from the GI Lab or within one week of having your procedure, call the [facility] to be advised whether a visit to your nearest Urgent Care or Emergency Department is indicated.  Take this paper with you if you go.     - If you develop an allergic reaction to the medications that were given during your procedure such as difficulty breathing, rash, hives, severe nausea, vomiting or lightheadedness.  - If you experience chest pain, shortness of breath, severe abdominal pain, fevers and chills.  -If you develop signs and symptoms of bleeding such as blood in your spit, if your stools turn black, tarry, or bloody  - If you have not urinated within 8 hours following your procedure.  - If your IV site becomes painful, red, inflamed, or looks infected.    If you received a biopsy/polypectomy/sphincterotomy the following instructions apply below:    __ Do not use Aspirin containing products, non-steroidal medications or anti-coagulants for one week following your procedure. (Examples of these types of medications are: Advil, Arthrotec, Aleve, Coumadin, Ecotrin, Heparin, Ibuprofen,  Indocin, Motrin, Naprosyn, Nuprin, Plavix, Vioxx, and Voltarin, or their generic forms.  This list is not all-inclusive.  Check with your physician or pharmacist before resuming medications.)   __ Eat a soft diet today.  Avoid foods that are poorly digested for the next 24 hours.  These foods would include: nuts, beans, lettuce, red meats, and fried foods. Start with liquids and advance your diet as tolerated, gradually work up to eating solids.   __ Do not have a Barium Study or Enema for one week.    Your physician recommends the additional following instructions:    -You have a contact number available for emergencies. The signs and symptoms of potential delayed complications were discussed with you. You may return to normal activities tomorrow.  -Resume your previous diet.  -Continue your present medications.   -We are waiting for your pathology results.  -Your physician has recommended a repeat colonoscopy (date to be determined after pending pathology results are reviewed) for surveillance based on pathology results.  -The findings and recommendations have been discussed with you.  -The findings and recommendations were discussed with your family.  - Please see Medication Reconciliation Form for new medication/medications prescribed.       If you experience any problems or have any questions following discharge from the GI Lab, please call:        Nurse Signature                                                                        Date___________________                                                                            Patient/Responsible Party Signature                                        Date___________________

## 2023-10-26 ASSESSMENT — PAIN SCALES - GENERAL: PAIN_LEVEL: 0

## 2023-11-06 LAB
LABORATORY COMMENT REPORT: NORMAL
PATH REPORT.FINAL DX SPEC: NORMAL
PATH REPORT.GROSS SPEC: NORMAL
PATH REPORT.TOTAL CANCER: NORMAL

## 2023-11-09 ENCOUNTER — TELEPHONE (OUTPATIENT)
Dept: GASTROENTEROLOGY | Facility: HOSPITAL | Age: 63
End: 2023-11-09
Payer: COMMERCIAL

## 2024-02-28 NOTE — PROGRESS NOTES
Subjective   Patient ID: Doug Yan Sr. is a 64 y.o. male who presents for trouble sleeping .    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 that made it difficult, regular boss is back now  - waking up at midnight, then today 3:30 am  - has to be up at 6am for work  - when he does wake up he tries to get up and read then go back when he feels sleepy, tried playing calming music  - melatonin gives him nightmares, has tried multiple times  - daytime fatigue and sleepiness, no napping  - then gets anxious about things, maybe some increased stress  - magnesium citrate started yesterday maybe this is helping  - this has never happened before  - wants something non habit forming to reset his sleep  - no exercise lately    Knee pain  - bilateral, L > R  - worse when it's going to rain  - swelling occasionally  - has been going on the last couple of months  - worse in the evening  - can happen with activity like squats but after doing a couple it loosens up  - no accidents or injury, played soccer for whole life  - tylenol and ibuprofen takes sometimes which does help the pain, prefers to not take medication         Review of Systems   Constitutional:  Positive for fatigue. Negative for chills and fever.   HENT: Negative.     Respiratory:  Negative for cough, chest tightness and shortness of breath.    Cardiovascular:  Negative for chest pain, palpitations and leg swelling.   Gastrointestinal:  Negative for constipation and diarrhea.   Genitourinary: Negative.    Musculoskeletal:  Positive for arthralgias and joint swelling.   Skin: Negative.    Neurological: Negative.    Psychiatric/Behavioral:  Positive for sleep disturbance.        Objective   /82 (BP Location: Right arm, Patient Position: Sitting)   Temp 36.4 °C (97.5 °F)   Wt 95.5 kg (210 lb 8.6 oz)   BMI  32.98 kg/m²     Physical Exam  Constitutional:       Appearance: Normal appearance.   Cardiovascular:      Rate and Rhythm: Normal rate and regular rhythm.      Heart sounds: No murmur heard.  Pulmonary:      Effort: Pulmonary effort is normal. No respiratory distress.      Breath sounds: Normal breath sounds.   Abdominal:      General: Abdomen is flat.      Palpations: Abdomen is soft.   Musculoskeletal:         General: No swelling.      Right lower leg: No edema.      Left lower leg: No edema.      Comments: Crepitus of bilateral knees, left greater than right  Negative posterior, anterior drawer and mcmurrays bilaterally   Skin:     General: Skin is warm and dry.   Neurological:      General: No focal deficit present.      Mental Status: He is alert.   Psychiatric:         Mood and Affect: Mood normal.         Behavior: Behavior normal.         Assessment/Plan   Diagnoses and all orders for this visit:  Insomnia, unspecified type  -     traZODone (Desyrel) 50 mg tablet; Take 0.5-1 tablets (25-50 mg) by mouth as needed at bedtime for sleep.  Chronic pain of both knees  -     XR knee 1-2 views bilateral; Future  -     Referral to Physical Therapy; Future  Discussed possible Ortho referral, pending imaging and PT    Discussed R/B/SE of Trazodone  Reviewed sleep hygiene as well    DEE Cordova    The patient was seen and evaluated by myself, as well as the medical student.  I agree with the note as above and have edited and addended the note.  [email or web address]

## 2024-02-29 ENCOUNTER — OFFICE VISIT (OUTPATIENT)
Dept: PRIMARY CARE | Facility: CLINIC | Age: 64
End: 2024-02-29
Payer: COMMERCIAL

## 2024-02-29 ENCOUNTER — HOSPITAL ENCOUNTER (OUTPATIENT)
Dept: RADIOLOGY | Facility: CLINIC | Age: 64
Discharge: HOME | End: 2024-02-29
Payer: COMMERCIAL

## 2024-02-29 VITALS
BODY MASS INDEX: 32.98 KG/M2 | TEMPERATURE: 97.5 F | WEIGHT: 210.54 LBS | SYSTOLIC BLOOD PRESSURE: 112 MMHG | DIASTOLIC BLOOD PRESSURE: 82 MMHG

## 2024-02-29 DIAGNOSIS — G89.29 CHRONIC PAIN OF BOTH KNEES: ICD-10-CM

## 2024-02-29 DIAGNOSIS — M25.562 CHRONIC PAIN OF BOTH KNEES: ICD-10-CM

## 2024-02-29 DIAGNOSIS — M25.561 CHRONIC PAIN OF BOTH KNEES: ICD-10-CM

## 2024-02-29 DIAGNOSIS — G47.00 INSOMNIA, UNSPECIFIED TYPE: Primary | ICD-10-CM

## 2024-02-29 PROCEDURE — 1160F RVW MEDS BY RX/DR IN RCRD: CPT | Performed by: FAMILY MEDICINE

## 2024-02-29 PROCEDURE — 1036F TOBACCO NON-USER: CPT | Performed by: FAMILY MEDICINE

## 2024-02-29 PROCEDURE — 1159F MED LIST DOCD IN RCRD: CPT | Performed by: FAMILY MEDICINE

## 2024-02-29 PROCEDURE — 99213 OFFICE O/P EST LOW 20 MIN: CPT | Performed by: FAMILY MEDICINE

## 2024-02-29 PROCEDURE — 73560 X-RAY EXAM OF KNEE 1 OR 2: CPT | Mod: 50

## 2024-02-29 PROCEDURE — 1126F AMNT PAIN NOTED NONE PRSNT: CPT | Performed by: FAMILY MEDICINE

## 2024-02-29 PROCEDURE — 73560 X-RAY EXAM OF KNEE 1 OR 2: CPT | Mod: BILATERAL PROCEDURE | Performed by: RADIOLOGY

## 2024-02-29 RX ORDER — TRAZODONE HYDROCHLORIDE 50 MG/1
25-50 TABLET ORAL NIGHTLY PRN
Qty: 30 TABLET | Refills: 0 | Status: SHIPPED | OUTPATIENT
Start: 2024-02-29 | End: 2025-02-28

## 2024-02-29 ASSESSMENT — ENCOUNTER SYMPTOMS
COUGH: 0
JOINT SWELLING: 1
SLEEP DISTURBANCE: 1
ARTHRALGIAS: 1
FATIGUE: 1
DIARRHEA: 0
CHILLS: 0
PALPITATIONS: 0
CONSTIPATION: 0
NEUROLOGICAL NEGATIVE: 1
FEVER: 0
CHEST TIGHTNESS: 0
SHORTNESS OF BREATH: 0

## 2024-03-27 ENCOUNTER — EVALUATION (OUTPATIENT)
Dept: PHYSICAL THERAPY | Facility: CLINIC | Age: 64
End: 2024-03-27
Payer: COMMERCIAL

## 2024-03-27 DIAGNOSIS — M25.562 CHRONIC PAIN OF LEFT KNEE: Primary | ICD-10-CM

## 2024-03-27 DIAGNOSIS — M25.561 CHRONIC PAIN OF BOTH KNEES: ICD-10-CM

## 2024-03-27 DIAGNOSIS — R26.2 DIFFICULTY WALKING UP STAIRS: ICD-10-CM

## 2024-03-27 DIAGNOSIS — G47.00 INSOMNIA, UNSPECIFIED TYPE: ICD-10-CM

## 2024-03-27 DIAGNOSIS — G89.29 CHRONIC PAIN OF BOTH KNEES: ICD-10-CM

## 2024-03-27 DIAGNOSIS — G89.29 CHRONIC PAIN OF LEFT KNEE: Primary | ICD-10-CM

## 2024-03-27 DIAGNOSIS — M25.562 CHRONIC PAIN OF BOTH KNEES: ICD-10-CM

## 2024-03-27 PROCEDURE — 97161 PT EVAL LOW COMPLEX 20 MIN: CPT | Mod: GP | Performed by: PHYSICAL THERAPIST

## 2024-03-27 RX ORDER — TRAZODONE HYDROCHLORIDE 50 MG/1
25-50 TABLET ORAL NIGHTLY PRN
Qty: 30 TABLET | Refills: 0 | OUTPATIENT
Start: 2024-03-27 | End: 2025-03-27

## 2024-03-27 ASSESSMENT — ENCOUNTER SYMPTOMS
OCCASIONAL FEELINGS OF UNSTEADINESS: 0
LOSS OF SENSATION IN FEET: 0
DEPRESSION: 0

## 2024-03-27 NOTE — PROGRESS NOTES
Physical Therapy    Physical Therapy Evaluation and Treatment      Patient Name: Doug Yan Sr.  MRN: [id number]  Today's Date: 3/29/2024   Time In: 1536  Time Out: 1635  Total Time: 59 minutes    Insurance: [employer] [place] [employer]   -20 visits/zoe yr (no auth)  -$500 DED  -$3,000 OOP max  -20% coins    Visit Number: 1    Assessment:   Patient is a 64 y.o. male who presents to PT with reports of chronic knee pain that has been worsening over the past several months. Impairments found on exam were limited, but included impaired distal stability during SLS, tenderness around chaitanya-patellar region, and positive meniscus test cluster (Alvarez's - medial meniscus, joint line tenderness, Thessaly's, and mechanical symptoms). Questionable mild edema noted in inferolateral patellar region. Signs and symptoms indicate potential degenerative meniscus tear, especially d/t patient's long history of soccer and planting on L LE. Other differentials include patellar bursitis and patellar tendinopathy. These impairments are limiting the patient's ability to take initial steps after period of inactivity, ascend stairs, and plant/rotate on L LE which is limiting participation in home/community negotiation and recreational activities. The impairments and functional limitations outlined above indicate a need for skilled PT services to assist the patient in returning to his prior level of function.    Standardized testing and measures administered today reveal that the patient has 3 impairments in body structures and functions, activity limitations, and participation restrictions. The patient has no personal factors and comorbidities that may serve as barriers affecting the plan of care. The patient's clinical presentation includes stable characteristics as noted during today's evaluation, & these findings indicate that this patient is of low complexity.     Plan:  OP PT Plan  Treatment/Interventions: Dry needling, Education/  "Instruction, Manual therapy, Neuromuscular re-education, Self care/ home management, Therapeutic activities, Therapeutic exercises  PT Plan: Skilled PT  PT Frequency: 1 time per week  Duration: 8 visits  Onset Date: 03/27/24  Number of Treatments Authorized: 20 visits/zoe yr  Rehab Potential: Good  Plan of Care Agreement: Patient    Next Visit: ankle stability, manual for knee distraction, proximal strengthening (ER, ABD, ext)    Current Problem:   1. Chronic pain of left knee        2. Chronic pain of both knees  Referral to Physical Therapy      3. Difficulty walking up stairs            Subjective    General:   Patient arrives to clinic w/ reports of chronic L knee pain that began insidiously~6 months ago and appears to be getting worse over the past 2 months. Pain is located around chaitanya-patellar region and medial/lateral joint line and is described as sharp and \"hovering.\" Average pain rated 4/10 and worst pain rated 6-7/10. Aggravating factors include stair ascension, initial movement after period of inactivity, weather changes. Alleviating factors include exercise that promotes knee flexion/extension, cycling, and Ibuprofen. Patient endorses partial L knee buckling twice over the past 6 months that goes away w/ walking. Patient endorses intermittent clicking in the knee joint. No history of prior knee trauma and no history of LE surgery.     Per referring provider, \"Knee pain - bilateral, L > R - worse when it's going to rain - swelling occasionally - has been going on the last couple of months - worse in the evening - can happen with activity like squats but after doing a couple it loosens up - no accidents or injury, played soccer for whole life - tylenol and ibuprofen takes sometimes which does help the pain, prefers to not take medication.\"    Relevant imaging (2/29/2024) reveals normal radiographs of knees.    Current Functional Limitations: Planting/rotational movements (I.e. soccer), straightening knee " "after prolonged knee flexion, stair ascension    Patient-Stated Goals: \"Be able to move my leg without getting hurt and relief of discomfort.\"    Relevant PMH:  -H/o lymphadentis     Home Living:   Lives in 3 story home w/ 2 full flights of stairs and bed/bath on 3rd floor, tub shower   Prior Level of Function:   Independent, unrestricted activity    All screenings for spiritual/cultural beliefs, depression, suicide, human trafficking, and domestic violence were reviewed and negative.    Key Learner (barriers): none    Objective   Observation:   -Standing posture: unremarkable  -Gait: unremarkable    Functional Movement:   -SL stance: symmetrical, ankle instability noted, no Trendelenberg     Lumbar/Hip Screen:  -Lumbar AROM: WNL, extension and R rotation incr knee pain slightly (likely d/t incr knee motion to compensate)  -Hip ROM: WNL and symmetrical flexion and IR, L ER WNL, R ER limited 50%    Knee AROM:   -Flexion: (R) WNL, (L) WNL  -Extension: (R) WNL (L) WNL    Strength:   -Flexion: (R) 5/5, (L) 5/5  -Extension: (R) 5/5, (L) 5/5  -Hip flexion: (R) 4+/5, (L) 5/5  -Hip ABD: (R) 4/5, (L) 5/5  -Hip ext: (R) 5/5, (L) 5/5  -Hip ER: (R) 5/5, (L) 5/5  -Hip IR: (R) 5/5, (L) 5/5    Palpation:  -TTP: distal medial quad, central medial/lateral joint line  -Other: mild edema noted on inferolateral patellar region    Special Tests:   *Ligamentous Instability:     -Valgus stress: (-)     -Varus stress: (-)     -Ant drawer: (-)     -Posterior drawer: (-)  *Meniscus:     -Alvarez's: (+) valgus + ER     -Thessaly's: (+) at full knee extension, (-) at 20 deg knee flexion     -OP knee flexion: (-)     -OP extension: (-)     Outcome Measures:  Unable to score d/t form incomplete    Treatments:  -Established HEP, patient performed several repetitions of each exercise to allow for assessment of performance accuracy  -Education, see section below    Access Code: [email or web address]  URL: [email or web address]  Date: " 03/27/2024  Prepared by: Vesta Anaya    Exercises  - Supine Quadriceps Stretch with Strap on Table  - 1 x daily - 7 x weekly - 3 sets - 30s hold  - Active Straight Leg Raise Advanced  - 1 x daily - 7 x weekly - 3 sets - 10 reps - 5s hold  - Clamshell with Resistance  - 1 x daily - 7 x weekly - 3 sets - 10 reps  - Squat with Chair Touch and Resistance Loop  - 1 x daily - 7 x weekly - 3 sets - 10 reps    EDUCATION:   Provided education on rationale behind PT diagnosis/prognosis, HEP, and PT POC. Provided education on role of meniscus for shock absorption and common MUNIRA, including degeneration. Patient verbalized understanding.    Goals:  By discharge,     1.) Patient will demonstrate independence with HEP to promote symptom relief and facilitate return to prior level of function.  2.) Patient will report decrease in pain by 2 points to meet the MCID.  3.) Score on LEFS will improve by >9 points to meet the MCID, demonstrate improved LE function, and demonstrate return to typical activities.   4.) Patient will demonstrate SLS for >30s w/ minimal instability and no UE support to promote distal stability needed for adequate knee support and facilitate pain relief w/ stair negotiation.   5.) Patient will report 0 instances of L knee buckling during mobility tasks to facilitate improved safety during I/ADLs and recreational activities.   6.) Patient will report ability to return to all regular activity w/ <2/10 L knee pain and subjective improvement in symptoms to promote return to PLOF.

## 2024-03-29 PROBLEM — R26.2 DIFFICULTY WALKING UP STAIRS: Status: ACTIVE | Noted: 2024-03-29

## 2024-03-29 PROBLEM — G89.29 CHRONIC PAIN OF LEFT KNEE: Status: ACTIVE | Noted: 2024-03-29

## 2024-03-29 PROBLEM — M25.562 CHRONIC PAIN OF LEFT KNEE: Status: ACTIVE | Noted: 2024-03-29

## 2024-04-12 ENCOUNTER — TREATMENT (OUTPATIENT)
Dept: PHYSICAL THERAPY | Facility: CLINIC | Age: 64
End: 2024-04-12
Payer: COMMERCIAL

## 2024-04-12 DIAGNOSIS — M25.562 CHRONIC PAIN OF LEFT KNEE: ICD-10-CM

## 2024-04-12 DIAGNOSIS — G89.29 CHRONIC PAIN OF LEFT KNEE: ICD-10-CM

## 2024-04-12 DIAGNOSIS — R26.2 DIFFICULTY WALKING UP STAIRS: Primary | ICD-10-CM

## 2024-04-12 PROCEDURE — 97110 THERAPEUTIC EXERCISES: CPT | Mod: GP | Performed by: PHYSICAL THERAPIST

## 2024-04-12 PROCEDURE — 97140 MANUAL THERAPY 1/> REGIONS: CPT | Mod: GP | Performed by: PHYSICAL THERAPIST

## 2024-04-12 NOTE — PROGRESS NOTES
Physical Therapy    Physical Therapy Treatment    Patient Name: Doug Yan Sr.  MRN: [id number]  Today's Date: 4/12/2024  Time Calculation  Start Time: 0918  Stop Time: 0958  Time Calculation (min): 40 min    Insurance: [employer] [place] [employer]   -20 visits/zoe yr (no auth)  -$500 DED  -$3,000 OOP max  -20% coins     Visit Number: 2 (2/20)    Assessment:   Session focused on reviewing HEP to answer patient questions as well progressing knee joint mobility and proximal/distal strength. Patient tolerated session very well w/ only mild increase in knee pain after some interventions. Ankle stability is still challenging, but single-leg balance improved w/ repetition. Patient required verbal cues for maintaining pelvic stability during single-leg interventions to decrease torque through knee joint. At this time, patient continues to require skilled PT services to address ongoing impairments in ankle/knee stability, muscle flexibility, and LE strength/endurance to promote return to PLOF.     Plan:   Continue with POC as tolerated.    Next Visit: ankle stability, manual for knee distraction, proximal strengthening (ER, ABD, ext), hip hiking off step, monster walks, core interventions    Current Problem  1. Difficulty walking up stairs        2. Chronic pain of left knee            Subjective    General  Patient arrives to the clinic w/ reports of improving symptoms in L knee. Patient reports ability to walk and exercise more easily w/ less pain. Patient has some questions about HEP, but states exercises are going well. States compliance w/ HEP and is w/out questions or concerns.      Objective     Treatments:  Therapeutic Exercise (30 min):  -Upright bike, x 5 min, lvl 1  -Supine SLR, 10 x 5s hold - mild quad lag noted on all repetitions  -Standing clam shells w/ red TB, 2 x 12 - mild pain w/ L stance  -Lateral stepping w/ red TB, 3 x 15ft R/L - mild pain in L stance   -SLS on flat ground, EO    Manual Therapy (8 min):    -Tibiofemoral distraction (L), 10 x 5s holds  -Distal quadriceps STM (L), x 3 min    Current HEP:  Access Code: [email or web address]  URL: [email or web address]  Date: 03/27/2024  Prepared by: Vesta Anaya     Exercises  - Supine Quadriceps Stretch with Strap on Table  - 1 x daily - 7 x weekly - 3 sets - 30s hold  - Active Straight Leg Raise Advanced  - 1 x daily - 7 x weekly - 3 sets - 10 reps - 5s hold  - Clamshell with Resistance  - 1 x daily - 7 x weekly - 3 sets - 10 reps  - Squat with Chair Touch and Resistance Loop  - 1 x daily - 7 x weekly - 3 sets - 10 reps    OP EDUCATION:   Provided education on the rationale behind interventions performed in the context of ongoing symptoms. Provided education on strategies to tie TB and perform stretching exercise at home to increase ease of performance., Patient verbalized understanding.    Goals:  By discharge,      1.) Patient will demonstrate independence with HEP to promote symptom relief and facilitate return to prior level of function.  2.) Patient will report decrease in pain by 2 points to meet the MCID.  3.) Score on LEFS will improve by >9 points to meet the MCID, demonstrate improved LE function, and demonstrate return to typical activities.   4.) Patient will demonstrate SLS for >30s w/ minimal instability and no UE support to promote distal stability needed for adequate knee support and facilitate pain relief w/ stair negotiation.   5.) Patient will report 0 instances of L knee buckling during mobility tasks to facilitate improved safety during I/ADLs and recreational activities.   6.) Patient will report ability to return to all regular activity w/ <2/10 L knee pain and subjective improvement in symptoms to promote return to PLOF.

## 2024-04-18 ENCOUNTER — APPOINTMENT (OUTPATIENT)
Dept: PHYSICAL THERAPY | Facility: CLINIC | Age: 64
End: 2024-04-18
Payer: COMMERCIAL

## 2024-04-23 ENCOUNTER — DOCUMENTATION (OUTPATIENT)
Dept: PHYSICAL THERAPY | Facility: CLINIC | Age: 64
End: 2024-04-23
Payer: COMMERCIAL

## 2024-04-23 NOTE — PROGRESS NOTES
Physical Therapy                 Therapy Communication Note    Patient Name: Doug Núñezandrew Carrera  MRN: [id number]  Today's Date: 4/23/2024     Discipline: Physical Therapy    Missed Visit Reason:  No Show    Missed Time: No Show

## 2024-04-24 ENCOUNTER — TELEMEDICINE (OUTPATIENT)
Dept: PRIMARY CARE | Facility: CLINIC | Age: 64
End: 2024-04-24
Payer: COMMERCIAL

## 2024-04-24 DIAGNOSIS — R05.1 ACUTE COUGH: Primary | ICD-10-CM

## 2024-04-24 DIAGNOSIS — J01.90 ACUTE NON-RECURRENT SINUSITIS, UNSPECIFIED LOCATION: ICD-10-CM

## 2024-04-24 PROCEDURE — 1159F MED LIST DOCD IN RCRD: CPT | Performed by: FAMILY MEDICINE

## 2024-04-24 PROCEDURE — 99213 OFFICE O/P EST LOW 20 MIN: CPT | Performed by: FAMILY MEDICINE

## 2024-04-24 PROCEDURE — 1160F RVW MEDS BY RX/DR IN RCRD: CPT | Performed by: FAMILY MEDICINE

## 2024-04-24 RX ORDER — BENZONATATE 100 MG/1
100 CAPSULE ORAL NIGHTLY PRN
Qty: 10 CAPSULE | Refills: 0 | Status: SHIPPED | OUTPATIENT
Start: 2024-04-24 | End: 2024-05-13 | Stop reason: WASHOUT

## 2024-04-24 ASSESSMENT — ENCOUNTER SYMPTOMS
HEADACHES: 1
SHORTNESS OF BREATH: 1
HEARTBURN: 0
WHEEZING: 1
FEVER: 0
MYALGIAS: 0
RHINORRHEA: 1
HEMOPTYSIS: 0
COUGH: 1
CHILLS: 1

## 2024-04-24 NOTE — PROGRESS NOTES
Subjective   Patient ID: Doug Yan Sr. is a 64 y.o. male who presents for No chief complaint on file..    Virtual or Telephone Consent    An interactive audio and video telecommunication system which permits real time communications between the patient (at the originating site) and provider (at the distant site) was utilized to provide this telehealth service.   Verbal consent was requested and obtained from Doug Yan Sr. on this date, 04/24/24 for a telehealth visit.       Pt reports he has been sick x 6 days  He has sinus pressure, cough, nasal congestion  He has frontal and maxillary sinus pressure  Cough is worse at night  He does not feel SOB    He was seen at urgent care on Monday and started on Augmentin as well    He was not tested for COVID or Flu  NKDA, only Latex    OTC Meds: Mucinex      Cough  This is a new problem. The current episode started in the past 7 days. The problem has been gradually worsening. The problem occurs every few minutes. The cough is Non-productive and productive of sputum. Associated symptoms include chills, headaches, nasal congestion, rhinorrhea, shortness of breath and wheezing. Pertinent negatives include no chest pain, ear congestion, ear pain, fever, heartburn, hemoptysis, myalgias, postnasal drip or rash. The symptoms are aggravated by cold air and lying down. Risk factors for lung disease include animal exposure.        Review of Systems   Constitutional:  Positive for chills. Negative for fever.   HENT:  Positive for rhinorrhea. Negative for ear pain and postnasal drip.    Respiratory:  Positive for cough, shortness of breath and wheezing. Negative for hemoptysis.    Cardiovascular:  Negative for chest pain.   Gastrointestinal:  Negative for heartburn.   Musculoskeletal:  Negative for myalgias.   Skin:  Negative for rash.   Neurological:  Positive for headaches.       Objective   There were no vitals taken for this visit.    Physical Exam  Constitutional:        Appearance: Normal appearance.   Neurological:      Mental Status: He is alert.       Virtual Visit Duration: 8 min    Assessment/Plan   Diagnoses and all orders for this visit:  Acute cough  -     benzonatate (Tessalon) 100 mg capsule; Take 1 capsule (100 mg) by mouth as needed at bedtime for cough. Do not crush or chew.  Acute non-recurrent sinusitis, unspecified location    Plan as per urgent care, encouraged him to complete course of Augmentin  Add Sudafed  Supportive care, fluids, Mucinex, rest  Advised pt to call with any new or worsening of symptoms    Marina MUNIRA Serrato, DO

## 2024-05-13 ENCOUNTER — TELEPHONE (OUTPATIENT)
Dept: PRIMARY CARE | Facility: CLINIC | Age: 64
End: 2024-05-13

## 2024-05-13 ENCOUNTER — OFFICE VISIT (OUTPATIENT)
Dept: PRIMARY CARE | Facility: CLINIC | Age: 64
End: 2024-05-13
Payer: COMMERCIAL

## 2024-05-13 VITALS
SYSTOLIC BLOOD PRESSURE: 120 MMHG | TEMPERATURE: 98 F | WEIGHT: 200 LBS | DIASTOLIC BLOOD PRESSURE: 76 MMHG | BODY MASS INDEX: 31.32 KG/M2

## 2024-05-13 DIAGNOSIS — K21.9 GASTROESOPHAGEAL REFLUX DISEASE, UNSPECIFIED WHETHER ESOPHAGITIS PRESENT: Primary | ICD-10-CM

## 2024-05-13 PROCEDURE — 1159F MED LIST DOCD IN RCRD: CPT | Performed by: FAMILY MEDICINE

## 2024-05-13 PROCEDURE — 1036F TOBACCO NON-USER: CPT | Performed by: FAMILY MEDICINE

## 2024-05-13 PROCEDURE — 1160F RVW MEDS BY RX/DR IN RCRD: CPT | Performed by: FAMILY MEDICINE

## 2024-05-13 PROCEDURE — 99213 OFFICE O/P EST LOW 20 MIN: CPT | Performed by: FAMILY MEDICINE

## 2024-05-13 RX ORDER — OMEPRAZOLE 20 MG/1
20 CAPSULE, DELAYED RELEASE ORAL 2 TIMES DAILY
Qty: 20 CAPSULE | Refills: 0 | Status: SHIPPED | OUTPATIENT
Start: 2024-05-13 | End: 2024-11-09

## 2024-05-13 ASSESSMENT — ENCOUNTER SYMPTOMS
NAUSEA: 1
ABDOMINAL PAIN: 1
CONSTIPATION: 0
DIARRHEA: 0
VOMITING: 0

## 2024-05-13 NOTE — TELEPHONE ENCOUNTER
Pt is calling stating that he went to the urgent care for a sinus infection and congestion they prescribed him Bromphen-PSE-DM. Pt stated that he had a allergic reaction from the medication and when back to the urgent care and they prescribed him Benzonatate 200 MG, Cetirizine 10 MG, and Prednisone 20 MG. Pt states that he has really bad acid reflux now and it hurts to eat. Pt states that this has been going on since Friday. Pt states he would like to see you doesn't want to go back to the urgent care. Is there anywhere we can add him on to?

## 2024-05-13 NOTE — PROGRESS NOTES
Subjective   Patient ID: Doug Yan Sr. is a 64 y.o. male who presents for Heartburn.    Pt presents with heartburn  Symptoms since Fri    He was started on Prednisone by urgent care on Friday  He was also on Bromphem, Tessalon perles and Zyrtec   He went to urgent care     He reports pain in his throat and a burning sensation in his throat  He has been taking with food  He was diagnosed with a sinus infection    He took a Prilosec over the weekend          Review of Systems   Gastrointestinal:  Positive for abdominal pain and nausea. Negative for constipation, diarrhea and vomiting.       Objective   /76 (BP Location: Right arm, Patient Position: Sitting)   Temp 36.7 °C (98 °F)   Wt 90.7 kg (200 lb)   BMI 31.32 kg/m²     Physical Exam  Vitals reviewed.   Constitutional:       Appearance: Normal appearance.   Cardiovascular:      Rate and Rhythm: Normal rate and regular rhythm.      Heart sounds: Normal heart sounds.   Pulmonary:      Breath sounds: Normal breath sounds.   Abdominal:      General: There is no distension.      Palpations: There is no mass.      Tenderness: There is no abdominal tenderness. There is no guarding or rebound.      Hernia: No hernia is present.   Neurological:      Mental Status: He is alert.         Assessment/Plan   Diagnoses and all orders for this visit:  Gastroesophageal reflux disease, unspecified whether esophagitis present  -     omeprazole (PriLOSEC) 20 mg DR capsule; Take 1 capsule (20 mg) by mouth 2 times a day. Do not crush or chew.       Recc bland diet for the next week   Avoid NSAIDs  Advised to call with any new or worsening of symptoms    Marina MUNIRA Serrato, DO

## 2024-05-29 ENCOUNTER — DOCUMENTATION (OUTPATIENT)
Dept: PHYSICAL THERAPY | Facility: CLINIC | Age: 64
End: 2024-05-29
Payer: COMMERCIAL

## 2024-05-29 NOTE — PROGRESS NOTES
Physical Therapy    Discharge Summary    Name: Doug Yan Sr.  MRN: [id number]  : 1960  Date: 24    Discharge Summary: PT    Discharge Information: Date of discharge 2024, Date of last visit 2024, Date of evaluation 3/27/2024, and Number of attended visits 2    Rehab Discharge Reason: Other Patient described significant improvements in knee pain/function after evaluation and 1st visit. Had additional visit scheduled, but was a no-show. Per policy, will discharge current POC d/t patient not returning for 30 days. PT to remain available should additional skilled needs arise.

## 2024-06-03 DIAGNOSIS — K21.9 GASTROESOPHAGEAL REFLUX DISEASE, UNSPECIFIED WHETHER ESOPHAGITIS PRESENT: ICD-10-CM

## 2024-06-03 RX ORDER — OMEPRAZOLE 20 MG/1
20 CAPSULE, DELAYED RELEASE ORAL 2 TIMES DAILY
Qty: 20 CAPSULE | Refills: 0 | OUTPATIENT
Start: 2024-06-03 | End: 2024-11-30

## 2024-06-18 DIAGNOSIS — K21.9 GASTROESOPHAGEAL REFLUX DISEASE, UNSPECIFIED WHETHER ESOPHAGITIS PRESENT: ICD-10-CM

## 2024-06-18 RX ORDER — OMEPRAZOLE 20 MG/1
20 CAPSULE, DELAYED RELEASE ORAL 2 TIMES DAILY
Qty: 60 CAPSULE | Refills: 1 | Status: SHIPPED | OUTPATIENT
Start: 2024-06-18 | End: 2024-06-18

## 2024-06-18 RX ORDER — OMEPRAZOLE 20 MG/1
20 CAPSULE, DELAYED RELEASE ORAL 2 TIMES DAILY
Qty: 60 CAPSULE | Refills: 1 | Status: SHIPPED | OUTPATIENT
Start: 2024-06-18 | End: 2024-12-15

## 2024-12-31 ENCOUNTER — APPOINTMENT (OUTPATIENT)
Dept: PRIMARY CARE | Facility: CLINIC | Age: 64
End: 2024-12-31
Payer: COMMERCIAL

## 2024-12-31 ENCOUNTER — HOSPITAL ENCOUNTER (OUTPATIENT)
Dept: RADIOLOGY | Facility: HOSPITAL | Age: 64
Discharge: HOME | End: 2024-12-31
Payer: COMMERCIAL

## 2024-12-31 VITALS
DIASTOLIC BLOOD PRESSURE: 82 MMHG | SYSTOLIC BLOOD PRESSURE: 128 MMHG | WEIGHT: 198 LBS | HEIGHT: 66 IN | TEMPERATURE: 97.7 F | BODY MASS INDEX: 31.82 KG/M2

## 2024-12-31 DIAGNOSIS — R93.1 AGATSTON CORONARY ARTERY CALCIUM SCORE LESS THAN 100: ICD-10-CM

## 2024-12-31 DIAGNOSIS — Z13.1 SCREENING FOR DIABETES MELLITUS: ICD-10-CM

## 2024-12-31 DIAGNOSIS — D34 THYROID FOLLICULAR ADENOMA: ICD-10-CM

## 2024-12-31 DIAGNOSIS — E04.1 THYROID NODULE: ICD-10-CM

## 2024-12-31 DIAGNOSIS — K21.9 GASTROESOPHAGEAL REFLUX DISEASE, UNSPECIFIED WHETHER ESOPHAGITIS PRESENT: ICD-10-CM

## 2024-12-31 DIAGNOSIS — E66.9 OBESITY (BMI 30-39.9): ICD-10-CM

## 2024-12-31 DIAGNOSIS — Z00.00 HEALTHCARE MAINTENANCE: Primary | ICD-10-CM

## 2024-12-31 PROBLEM — M54.2 NECK PAIN: Status: RESOLVED | Noted: 2023-08-26 | Resolved: 2024-12-31

## 2024-12-31 PROBLEM — M54.9 MID BACK PAIN: Status: RESOLVED | Noted: 2023-08-26 | Resolved: 2024-12-31

## 2024-12-31 PROCEDURE — 1158F ADVNC CARE PLAN TLK DOCD: CPT

## 2024-12-31 PROCEDURE — 76536 US EXAM OF HEAD AND NECK: CPT

## 2024-12-31 PROCEDURE — 1123F ACP DISCUSS/DSCN MKR DOCD: CPT

## 2024-12-31 PROCEDURE — 90677 PCV20 VACCINE IM: CPT

## 2024-12-31 PROCEDURE — 99397 PER PM REEVAL EST PAT 65+ YR: CPT

## 2024-12-31 PROCEDURE — 76536 US EXAM OF HEAD AND NECK: CPT | Performed by: RADIOLOGY

## 2024-12-31 PROCEDURE — 3008F BODY MASS INDEX DOCD: CPT

## 2024-12-31 PROCEDURE — 90471 IMMUNIZATION ADMIN: CPT

## 2024-12-31 PROCEDURE — 1159F MED LIST DOCD IN RCRD: CPT

## 2024-12-31 ASSESSMENT — PATIENT HEALTH QUESTIONNAIRE - PHQ9
1. LITTLE INTEREST OR PLEASURE IN DOING THINGS: NOT AT ALL
2. FEELING DOWN, DEPRESSED OR HOPELESS: NOT AT ALL
SUM OF ALL RESPONSES TO PHQ9 QUESTIONS 1 AND 2: 0

## 2024-12-31 NOTE — PROGRESS NOTES
"Subjective   Patient ID: Doug Yan Sr. is a 64 y.o. male who presents for Annual Exam (NOT FASTING) and Med Refill (Requesting refills for Trazodone.).    Patient here today for medication refills and annual physical.  States that his health is okay, has been having anxiety concerns. Daughter was in recent head on collision. Additionally recently opening business.  Difficulty with sleeping. Has been taking Ashwaganda which ha shelped.  Now that things are improving has felt symptoms to be getting better. Is also doing therapy.  Has been having some tremor, intentional tremor.   Started vegan/vegetarian store.  Exercise: Exercises at home    Had colonoscopy in 2023: Inflammatory polyp, 5 year screening  PSA in June 2023, within normal limits.  Denies any BPH symptoms.  No family history significant for prostate pathologies.    Hx of Thyroid adenocarcinoma- states they wanted to do \"nuclear study\", however wanted \"thousands of dollars\" and has subsequently not been further evaluated.  Per chart review has had previous FNA cytology, endocrinology recommended molecular testing/repeat FNA however has not completed.  Last evaluation by endocrinology August 2020.    Immunizations: Tetanus booster/Prevnar 20 due, additionally recommend Shingrix         Review of Systems  ROS negative unless stated otherwise in HPI  Objective   /82 (BP Location: Right arm, Patient Position: Sitting)   Temp 36.5 °C (97.7 °F) (Skin)   Ht 1.676 m (5' 6\")   Wt 89.8 kg (198 lb)   BMI 31.96 kg/m²     Physical Exam  Constitutional:       General: He is not in acute distress.     Appearance: He is not ill-appearing.   HENT:      Right Ear: Tympanic membrane normal.      Left Ear: Tympanic membrane normal.      Nose: Nose normal. No congestion.      Mouth/Throat:      Pharynx: No oropharyngeal exudate or posterior oropharyngeal erythema.   Eyes:      General: No scleral icterus.     Extraocular Movements: Extraocular movements intact. "   Cardiovascular:      Rate and Rhythm: Normal rate and regular rhythm.      Pulses: Normal pulses.      Heart sounds: Normal heart sounds. No murmur heard.     No friction rub. No gallop.   Pulmonary:      Effort: Pulmonary effort is normal.      Breath sounds: Normal breath sounds. No wheezing, rhonchi or rales.   Abdominal:      General: Abdomen is flat. There is no distension.      Palpations: Abdomen is soft.      Tenderness: There is no abdominal tenderness. There is no guarding.   Musculoskeletal:      Cervical back: No tenderness.      Right lower leg: No edema.      Left lower leg: No edema.   Lymphadenopathy:      Cervical: No cervical adenopathy.   Skin:     General: Skin is warm and dry.      Findings: No rash.   Neurological:      General: No focal deficit present.      Mental Status: He is alert and oriented to person, place, and time.      Cranial Nerves: No cranial nerve deficit.      Deep Tendon Reflexes: Reflexes normal.   Psychiatric:         Mood and Affect: Mood normal.         Behavior: Behavior normal.         Assessment/Plan   Problem List Items Addressed This Visit             ICD-10-CM    Obesity (BMI 30-39.9) E66.9    Thyroid nodule E04.1    Agatston coronary artery calcium score less than 100 R93.1    GERD (gastroesophageal reflux disease) K21.9    Thyroid follicular adenoma D34     3 years prior had recommendation from endocrinology to repeat FNA cytology with or without molecular testing.  Patient had attempted to contact his insurance company to determine whether or not molecular testing would be covered and was informed that there would be a large out-of-pocket expense associated.  Despite this has not completed repeat FNAC  Does endorse that he feels food to be caught when eating brittle foods (e.g. popcorn).  Denies any solid or liquid dysphagia otherwise.  Referral placed to endocrinology for patient to reestablish  Repeat order for thyroid ultrasound given swallowing intolerances  to monitor previously identified nodule.  Will yield further workup to endocrinology         Relevant Orders    Referral to Endocrinology    US thyroid     Other Visit Diagnoses         Codes    Healthcare maintenance    -  Primary Z00.00    Relevant Orders    Vitamin D 25-Hydroxy,Total (for eval of Vitamin D levels)    Comprehensive Metabolic Panel    TSH with reflex to Free T4 if abnormal    Lipid Panel    CBC and Auto Differential    Pneumococcal conjugate vaccine, 20-valent (PREVNAR 20) (Completed)    Screening for diabetes mellitus     Z13.1    Relevant Orders    Hemoglobin A1c          Jorge Martinez,

## 2025-01-02 ENCOUNTER — OFFICE VISIT (OUTPATIENT)
Dept: ENDOCRINOLOGY | Facility: CLINIC | Age: 65
End: 2025-01-02
Payer: COMMERCIAL

## 2025-01-02 VITALS
HEART RATE: 72 BPM | SYSTOLIC BLOOD PRESSURE: 124 MMHG | TEMPERATURE: 95.9 F | WEIGHT: 199 LBS | BODY MASS INDEX: 31.98 KG/M2 | DIASTOLIC BLOOD PRESSURE: 84 MMHG | HEIGHT: 66 IN

## 2025-01-02 DIAGNOSIS — D34 THYROID FOLLICULAR ADENOMA: ICD-10-CM

## 2025-01-02 PROCEDURE — 1159F MED LIST DOCD IN RCRD: CPT | Performed by: STUDENT IN AN ORGANIZED HEALTH CARE EDUCATION/TRAINING PROGRAM

## 2025-01-02 PROCEDURE — 99204 OFFICE O/P NEW MOD 45 MIN: CPT | Performed by: STUDENT IN AN ORGANIZED HEALTH CARE EDUCATION/TRAINING PROGRAM

## 2025-01-02 PROCEDURE — 1123F ACP DISCUSS/DSCN MKR DOCD: CPT | Performed by: STUDENT IN AN ORGANIZED HEALTH CARE EDUCATION/TRAINING PROGRAM

## 2025-01-02 PROCEDURE — 3008F BODY MASS INDEX DOCD: CPT | Performed by: STUDENT IN AN ORGANIZED HEALTH CARE EDUCATION/TRAINING PROGRAM

## 2025-01-02 PROCEDURE — G2211 COMPLEX E/M VISIT ADD ON: HCPCS | Performed by: STUDENT IN AN ORGANIZED HEALTH CARE EDUCATION/TRAINING PROGRAM

## 2025-01-02 ASSESSMENT — ENCOUNTER SYMPTOMS
DEPRESSION: 0
LOSS OF SENSATION IN FEET: 0
OCCASIONAL FEELINGS OF UNSTEADINESS: 0

## 2025-01-02 NOTE — ASSESSMENT & PLAN NOTE
3 years prior had recommendation from endocrinology to repeat FNA cytology with or without molecular testing.  Patient had attempted to contact his insurance company to determine whether or not molecular testing would be covered and was informed that there would be a large out-of-pocket expense associated.  Despite this has not completed repeat FNAC  Does endorse that he feels food to be caught when eating brittle foods (e.g. popcorn).  Denies any solid or liquid dysphagia otherwise.  Referral placed to endocrinology for patient to reestablish.  Is agreeable to schedule with soonest available provider  Repeat order for thyroid ultrasound given swallowing intolerances to monitor previously identified nodule.  Will yield further workup to endocrinology.  Review of systems/history: Patient without significant thyroid symptoms.  VSS

## 2025-01-02 NOTE — PROGRESS NOTES
Endocrinology  1/2/2025     History of Present Illness   Doug Yan Sr. is a 64 y.o. year old male with medical history of thyroid nodule, GERD here for thyroid nodule.    LV: 2020, Dr. Birmingham     Four years ago (2020) he was diagnosed with a thyroid nodule (L, 15 x 11 x 15 mm, hypoechoic and solid). Had FNA done. Results were FLUS and molecular testing was recommended. At the time, this was cost prohibitive for him and MT was not done.     He had a repeat ultrasound done 12/31/2024.   Report is not available yet, but able to review imaging.     No history of thyroid dysfunction.   Never on thyroid medication.     Neck pain: Denies    Difficulty breathing: Denies    Difficulty swallowing: Denies    Change in voice: Denies      Family history of thyroid disease: Denies    History of radiation to the head/neck/chest: Denies    Amiodarone use: No    Biotin use: Denies      Medications     Current Outpatient Medications   Medication Instructions    omeprazole (PRILOSEC) 20 mg, oral, 2 times daily, Do not crush or chew.    traZODone (DESYREL) 25-50 mg, oral, Nightly PRN          History      Past Medical History:   Diagnosis Date    Colon polyp     GERD (gastroesophageal reflux disease)     Mid back pain 08/26/2023    Neck pain 08/26/2023       Past Surgical History:   Procedure Laterality Date    APPENDECTOMY  06/21/2017    Appendectomy    OTHER SURGICAL HISTORY  05/11/2020    Cyst excision       Family History   Problem Relation Name Age of Onset    No Known Problems Father      Hypertension Brother Rusty     Accidental death Brother Maurilio     Early natural death Sister Geovanna         Allergies   Allergen Reactions    Latex Hives        Social history:   - Denies alcohol, tobacco, recreational drug use   - 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[employer]   - Lives with wife      Physical Exam   /84 (BP Location: Right arm, Patient Position: Sitting, BP Cuff Size: Adult)   Pulse 72   Temp 35.5 °C (95.9 °F)  "(Temporal)   Ht 1.676 m (5' 6\")   Wt 90.3 kg (199 lb)   BMI 32.12 kg/m²   General: Well appearing, no acute distress  Heart: Normal rate  Neck: Soft, nontender, no lymphadenopathy, thyroid normal in size   Lungs: Breathing comfortably on room air   Skin: No rashes      Labs and Imaging     Lab Results   Component Value Date    TSH 1.41 05/04/2020     MRN: [id number]   Patient Name DOUG FUNG Sr   Accession #: [id number]   Date of Procedure:  6/5/2020       Date Reported: 6/9/2020   Date Received:  6/5/2020   Date of Birth / Sex 1960 (Age: 60) / M   Race: MULTI RACIAL   Submitting Physician: MAJO SCHWARZ MD   Attending Physician: MARTA WHITE MD            Other External #          FINAL CYTOLOGICAL INTERPRETATION     A.  FINE NEEDLE ASPIRATION OF THYROID - LEFT LOBE:   --FOLLICULAR LESION OF UNDETERMINED SIGNIFICANCE.   --SEE NOTE.     Note: The aspirate has limited cellularity and is composed predominantly of   Hurthle cells and colloid in the background. In the presence of multiple   nodules this could represent Hurthle cell hyperplasia in the setting of   multinodular goiter, however Hurthle cell neoplasm cannot be entirely excluded.     Ultrasound 12/31/2024      Ultrasound 5/7/2020     Assessment and Plan   Doug Fung Sr. is a 64 y.o. year old male with medical history of thyroid nodule, GERD here for thyroid nodule. He is s/p FNA of left sided nodule which showed FLUS in 2020. Repeat ultrasound done 12/31/2024 shows nodule is measuring slightly smaller in size (1.36 x 1.03 x 1.30 cm 2024 vs 1.52 x 1.13 x 1.49 cm 2020) and remains hypoechoic, mostly solid, with smooth borders and wider than tall as well.     Overall, he has a stable left sided TIRADS 4 nodule. Given the stability since 2020, I do not think needs repeat FNA at this time.     # Thyroid nodule:   - Plan to repeat thyroid ultrasound in 2 years. Asked patient to message me in 1 year for order.     RTC: 2 years  "     Cyn Taylor, DO   Endocrinology

## 2025-01-02 NOTE — LETTER
January 3, 2025     Jorge Martinez DO  [id number] [address] [facility]  [address]  [place] [address]    Patient: Dogu Yan Sr.   YOB: 1960   Date of Visit: 1/2/2025       Dear Dr. Jorge Martinez DO:    Thank you for referring Doug Yan to me for evaluation. Below are my notes for this consultation.  If you have questions, please do not hesitate to call me. I look forward to following your patient along with you.       Sincerely,     Cyn Taylor DO      CC: No Recipients  ______________________________________________________________________________________    Endocrinology  1/2/2025     History of Present Illness   Doug Yan Sr. is a 64 y.o. year old male with medical history of thyroid nodule, GERD here for thyroid nodule.    LV: 2020, Dr. Birmingham     Four years ago (2020) he was diagnosed with a thyroid nodule (L, 15 x 11 x 15 mm, hypoechoic and solid). Had FNA done. Results were FLUS and molecular testing was recommended. At the time, this was cost prohibitive for him and MT was not done.     He had a repeat ultrasound done 12/31/2024.   Report is not available yet, but able to review imaging.     No history of thyroid dysfunction.   Never on thyroid medication.     Neck pain: Denies    Difficulty breathing: Denies    Difficulty swallowing: Denies    Change in voice: Denies      Family history of thyroid disease: Denies    History of radiation to the head/neck/chest: Denies    Amiodarone use: No    Biotin use: Denies      Medications     Current Outpatient Medications   Medication Instructions   • omeprazole (PRILOSEC) 20 mg, oral, 2 times daily, Do not crush or chew.   • traZODone (DESYREL) 25-50 mg, oral, Nightly PRN          History      Past Medical History:   Diagnosis Date   • Colon polyp    • GERD (gastroesophageal reflux disease)    • Mid back pain 08/26/2023   • Neck pain 08/26/2023       Past Surgical History:   Procedure Laterality Date   • APPENDECTOMY  06/21/2017    Appendectomy  "  • OTHER SURGICAL HISTORY  05/11/2020    Cyst excision       Family History   Problem Relation Name Age of Onset   • No Known Problems Father     • Hypertension Brother Rusty    • Accidental death Brother Maurilio    • Early natural death Sister Geovanna         Allergies   Allergen Reactions   • Latex Hives        Social history:   - Denies alcohol, tobacco, recreational drug use   - 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[employer]   - Lives with wife      Physical Exam   /84 (BP Location: Right arm, Patient Position: Sitting, BP Cuff Size: Adult)   Pulse 72   Temp 35.5 °C (95.9 °F) (Temporal)   Ht 1.676 m (5' 6\")   Wt 90.3 kg (199 lb)   BMI 32.12 kg/m²   General: Well appearing, no acute distress  Heart: Normal rate  Neck: Soft, nontender, no lymphadenopathy, thyroid normal in size   Lungs: Breathing comfortably on room air   Skin: No rashes      Labs and Imaging     Lab Results   Component Value Date    TSH 1.41 05/04/2020     MRN: [id number]   Patient Name CHASTITY FUNG Sr   Accession #: [id number]   Date of Procedure:  6/5/2020       Date Reported: 6/9/2020   Date Received:  6/5/2020   Date of Birth / Sex 1960 (Age: 60) / M   Race: MULTI RACIAL   Submitting Physician: MAJO SCHWARZ MD   Attending Physician: MARTA WHITE MD            Other External #          FINAL CYTOLOGICAL INTERPRETATION     A.  FINE NEEDLE ASPIRATION OF THYROID - LEFT LOBE:   --FOLLICULAR LESION OF UNDETERMINED SIGNIFICANCE.   --SEE NOTE.     Note: The aspirate has limited cellularity and is composed predominantly of   Hurthle cells and colloid in the background. In the presence of multiple   nodules this could represent Hurthle cell hyperplasia in the setting of   multinodular goiter, however Hurthle cell neoplasm cannot be entirely excluded.     Ultrasound 12/31/2024      Ultrasound 5/7/2020     Assessment and Plan   Chastity Fung . is a 64 y.o. year old male with medical history of thyroid nodule, GERD here " for thyroid nodule. He is s/p FNA of left sided nodule which showed FLUS in 2020. Repeat ultrasound done 12/31/2024 shows nodule is measuring slightly smaller in size (1.36 x 1.03 x 1.30 cm 2024 vs 1.52 x 1.13 x 1.49 cm 2020) and remains hypoechoic, mostly solid, with smooth borders and wider than tall as well.     Overall, he has a stable left sided TIRADS 4 nodule. Given the stability since 2020, I do not think needs repeat FNA at this time.     # Thyroid nodule:   - Plan to repeat thyroid ultrasound in 2 years. Asked patient to message me in 1 year for order.     RTC: 2 years      Cyn Taylor DO   Endocrinology

## 2025-01-02 NOTE — PATIENT INSTRUCTIONS
- At this point, you do not need another biopsy of the thyroid nodule. It looks stable from the last time you had ultrasound/biopsy done   - We can plan to repeat an ultrasound in 2 years

## 2025-01-27 DIAGNOSIS — Z78.9 MEASLES, MUMPS, RUBELLA (MMR) VACCINATION STATUS UNKNOWN: Primary | ICD-10-CM

## 2025-01-27 DIAGNOSIS — Z11.1 ENCOUNTER FOR SCREENING FOR RESPIRATORY TUBERCULOSIS: ICD-10-CM

## 2025-01-27 DIAGNOSIS — G47.00 INSOMNIA, UNSPECIFIED TYPE: ICD-10-CM

## 2025-01-27 DIAGNOSIS — K21.9 GASTROESOPHAGEAL REFLUX DISEASE, UNSPECIFIED WHETHER ESOPHAGITIS PRESENT: ICD-10-CM

## 2025-01-27 RX ORDER — OMEPRAZOLE 20 MG/1
20 CAPSULE, DELAYED RELEASE ORAL 2 TIMES DAILY
Qty: 60 CAPSULE | Refills: 1 | Status: SHIPPED | OUTPATIENT
Start: 2025-01-27 | End: 2025-07-26

## 2025-01-27 RX ORDER — TRAZODONE HYDROCHLORIDE 50 MG/1
25-50 TABLET ORAL NIGHTLY PRN
Qty: 30 TABLET | Refills: 0 | Status: SHIPPED | OUTPATIENT
Start: 2025-01-27 | End: 2026-01-27

## 2025-01-29 LAB
25(OH)D3+25(OH)D2 SERPL-MCNC: 32 NG/ML (ref 30–100)
ALBUMIN SERPL-MCNC: 4.2 G/DL (ref 3.6–5.1)
ALP SERPL-CCNC: 66 U/L (ref 35–144)
ALT SERPL-CCNC: 22 U/L (ref 9–46)
ANION GAP SERPL CALCULATED.4IONS-SCNC: 6 MMOL/L (CALC) (ref 7–17)
AST SERPL-CCNC: 16 U/L (ref 10–35)
BASOPHILS # BLD AUTO: 30 CELLS/UL (ref 0–200)
BASOPHILS NFR BLD AUTO: 0.5 %
BILIRUB SERPL-MCNC: 1.1 MG/DL (ref 0.2–1.2)
BUN SERPL-MCNC: 11 MG/DL (ref 7–25)
CALCIUM SERPL-MCNC: 8.8 MG/DL (ref 8.6–10.3)
CHLORIDE SERPL-SCNC: 105 MMOL/L (ref 98–110)
CHOLEST SERPL-MCNC: 189 MG/DL
CHOLEST/HDLC SERPL: 4.4 (CALC)
CO2 SERPL-SCNC: 29 MMOL/L (ref 20–32)
CREAT SERPL-MCNC: 1.08 MG/DL (ref 0.7–1.35)
EGFRCR SERPLBLD CKD-EPI 2021: 75 ML/MIN/1.73M2
EOSINOPHIL # BLD AUTO: 108 CELLS/UL (ref 15–500)
EOSINOPHIL NFR BLD AUTO: 1.8 %
ERYTHROCYTE [DISTWIDTH] IN BLOOD BY AUTOMATED COUNT: 12.4 % (ref 11–15)
EST. AVERAGE GLUCOSE BLD GHB EST-MCNC: 111 MG/DL
EST. AVERAGE GLUCOSE BLD GHB EST-SCNC: 6.2 MMOL/L
GLUCOSE SERPL-MCNC: 85 MG/DL (ref 65–99)
HBA1C MFR BLD: 5.5 % OF TOTAL HGB
HCT VFR BLD AUTO: 47.5 % (ref 38.5–50)
HDLC SERPL-MCNC: 43 MG/DL
HGB BLD-MCNC: 15.7 G/DL (ref 13.2–17.1)
LDLC SERPL CALC-MCNC: 122 MG/DL (CALC)
LYMPHOCYTES # BLD AUTO: 1710 CELLS/UL (ref 850–3900)
LYMPHOCYTES NFR BLD AUTO: 28.5 %
MCH RBC QN AUTO: 30.4 PG (ref 27–33)
MCHC RBC AUTO-ENTMCNC: 33.1 G/DL (ref 32–36)
MCV RBC AUTO: 92.1 FL (ref 80–100)
MEV IGG SER IA-ACNC: >300 AU/ML
MONOCYTES # BLD AUTO: 522 CELLS/UL (ref 200–950)
MONOCYTES NFR BLD AUTO: 8.7 %
MUV IGG SER IA-ACNC: 42.4 AU/ML
NEUTROPHILS # BLD AUTO: 3630 CELLS/UL (ref 1500–7800)
NEUTROPHILS NFR BLD AUTO: 60.5 %
NONHDLC SERPL-MCNC: 146 MG/DL (CALC)
PLATELET # BLD AUTO: 261 THOUSAND/UL (ref 140–400)
PMV BLD REES-ECKER: 10.2 FL (ref 7.5–12.5)
POTASSIUM SERPL-SCNC: 4.6 MMOL/L (ref 3.5–5.3)
PROT SERPL-MCNC: 6.8 G/DL (ref 6.1–8.1)
RBC # BLD AUTO: 5.16 MILLION/UL (ref 4.2–5.8)
RUBV IGG SERPL IA-ACNC: >33 INDEX
SODIUM SERPL-SCNC: 140 MMOL/L (ref 135–146)
TRIGL SERPL-MCNC: 125 MG/DL
TSH SERPL-ACNC: 1.48 MIU/L (ref 0.4–4.5)
WBC # BLD AUTO: 6 THOUSAND/UL (ref 3.8–10.8)

## 2025-01-31 LAB
IGNF NEG CNTRL BLD: NORMAL
M TB IFN-G BLD-IMP: NEGATIVE
MITOGEN IGNF.SPOT COUNT BLD: NORMAL
QUEST PANEL A SPOT COUNT: 0
QUEST PANEL B SPOT COUNT: 1

## 2025-02-04 ENCOUNTER — APPOINTMENT (OUTPATIENT)
Dept: PRIMARY CARE | Facility: CLINIC | Age: 65
End: 2025-02-04
Payer: COMMERCIAL

## 2025-02-04 VITALS
RESPIRATION RATE: 18 BRPM | OXYGEN SATURATION: 97 % | SYSTOLIC BLOOD PRESSURE: 136 MMHG | DIASTOLIC BLOOD PRESSURE: 82 MMHG | HEIGHT: 66 IN | BODY MASS INDEX: 32.62 KG/M2 | TEMPERATURE: 97.4 F | WEIGHT: 203 LBS | HEART RATE: 40 BPM

## 2025-02-04 DIAGNOSIS — E78.2 MIXED HYPERLIPIDEMIA: ICD-10-CM

## 2025-02-04 DIAGNOSIS — I49.1 PREMATURE ATRIAL CONTRACTIONS: Primary | ICD-10-CM

## 2025-02-04 DIAGNOSIS — M79.605 PAIN OF LEFT LOWER EXTREMITY: ICD-10-CM

## 2025-02-04 PROCEDURE — 93000 ELECTROCARDIOGRAM COMPLETE: CPT

## 2025-02-04 PROCEDURE — 1159F MED LIST DOCD IN RCRD: CPT

## 2025-02-04 PROCEDURE — 3008F BODY MASS INDEX DOCD: CPT

## 2025-02-04 PROCEDURE — 99214 OFFICE O/P EST MOD 30 MIN: CPT

## 2025-02-04 PROCEDURE — 1123F ACP DISCUSS/DSCN MKR DOCD: CPT

## 2025-02-04 RX ORDER — SIMVASTATIN 20 MG/1
20 TABLET, FILM COATED ORAL NIGHTLY
Qty: 30 TABLET | Refills: 0 | Status: SHIPPED | OUTPATIENT
Start: 2025-02-04 | End: 2025-03-06

## 2025-02-04 ASSESSMENT — PATIENT HEALTH QUESTIONNAIRE - PHQ9
2. FEELING DOWN, DEPRESSED OR HOPELESS: NOT AT ALL
SUM OF ALL RESPONSES TO PHQ9 QUESTIONS 1 AND 2: 0
1. LITTLE INTEREST OR PLEASURE IN DOING THINGS: NOT AT ALL

## 2025-02-04 NOTE — PROGRESS NOTES
"Subjective   Patient ID: Doug Yan Sr. is a 65 y.o. male who presents for Follow-up (Review labs) and Leg Pain (Left leg pain next to knee. His left ankle is swelling up. Symptoms for about 3 days. No known injury. He is also have night pain.).    Patient here today to follow up regarding blood work  Overall laboratory evaluations were largely nonremarkable, did have evidence of very mild depression and GFR unclear if transient secondary to fasting therefore decrease p.o. liquid intake.  Additionally serum lipid panel showed increase in LDL cholesterol.  Patient overall eats balanced.  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  ASCVD risk approximately 17%    Additionally patient notable for bradycardia.  Denies any symptoms associated, chest discomfort, no shortness of breath, no dizziness no syncope or presyncope that patient endorses.  Previously evaluated by cardiology for chest discomfort along with PACs.  Was asymptomatic at this time, last evaluation in late 2022.    Additionally patient complaining of new onset left leg pain.  Localizes discomfort over left fibular head.  Patient states that he will have radiation occasionally down into foot.  Sharp and stabbing quality.  No foot drop reported.  No preceding trauma.  Additionally endorses some swelling around left ankle.  No superimposed erythema or warmth over joint.         Review of Systems    Objective   /82 (BP Location: Right arm, Patient Position: Sitting)   Pulse (!) 40 Comment: irregular  Temp 36.3 °C (97.4 °F)   Resp 18   Ht 1.676 m (5' 6\")   Wt 92.1 kg (203 lb)   SpO2 97%   BMI 32.77 kg/m²     Physical Exam  Constitutional:       General: He is not in acute distress.     Appearance: Normal appearance. He is not ill-appearing.   Eyes:      General: No scleral icterus.  Cardiovascular:      Rate and Rhythm: Bradycardia present. Rhythm irregular.      Heart sounds: No murmur heard.     No " friction rub. No gallop.   Pulmonary:      Effort: Pulmonary effort is normal. No respiratory distress.      Breath sounds: Normal breath sounds. No wheezing, rhonchi or rales.   Musculoskeletal:         General: Tenderness present.      Comments: Some mild tenderness along left fibular head.  Range of motion mildly decreased with leg extension.  Hypertonicity notable along hamstring on left.  Trace edema notable at left ankle, range of motion full.  Bilateral calf circumference equivalent.   Neurological:      General: No focal deficit present.      Mental Status: He is alert and oriented to person, place, and time.   Psychiatric:         Mood and Affect: Mood normal.         Behavior: Behavior normal.         Assessment/Plan   Problem List Items Addressed This Visit             ICD-10-CM    Hyperlipidemia E78.5     Given hyperlipidemia along with elevated ASCVD risk approaching 20% we will recommend statin therapy at this time.  Patient has history of calcium score of 0 on previous assay however was 2 years prior.  Through shared decision making, elected trial of statin.  Discussed R/B/SE associated with this medication  Follow-up 1 month, we will repeat lipid panel         Relevant Medications    simvastatin (Zocor) 20 mg tablet    Premature atrial contractions - Primary I49.1     Has history of previously documented PACs.  Calculated heart rate to be approximately low 50s.  Will recommend Holter monitor for short course to identify burden of PACs as on rhythm strip showed to be approximately 30%.  Additionally recommend patient to reestablish with cardiology after completion of Holter monitor         Relevant Orders    ECG 12 Lead (Completed)    Referral to Cardiology    Holter or Event Cardiac Monitor    Pain of left lower extremity M79.605     Suspect symptoms at this time are secondary to hamstring strain/hypertonicity causing pain at fibular head/fibular head dysfunction.  Recommend patient begin gentle  stretching of the area along with heating packs to improve hypertonicity.  No concerning physical exam findings.  Will reevaluate at follow-up.          Jorge Martinez, DO

## 2025-02-05 PROBLEM — L90.5 SCAR OF BACK: Status: RESOLVED | Noted: 2023-08-26 | Resolved: 2025-02-05

## 2025-02-05 PROBLEM — M79.605 PAIN OF LEFT LOWER EXTREMITY: Status: ACTIVE | Noted: 2025-02-05

## 2025-02-05 NOTE — ASSESSMENT & PLAN NOTE
Suspect symptoms at this time are secondary to hamstring strain/hypertonicity causing pain at fibular head/fibular head dysfunction.  Recommend patient begin gentle stretching of the area along with heating packs to improve hypertonicity.  No concerning physical exam findings.  Will reevaluate at follow-up.

## 2025-02-05 NOTE — ASSESSMENT & PLAN NOTE
Given hyperlipidemia along with elevated ASCVD risk approaching 20% we will recommend statin therapy at this time.  Patient has history of calcium score of 0 on previous assay however was 2 years prior.  Through shared decision making, elected trial of statin.  Discussed R/B/SE associated with this medication  Follow-up 1 month, we will repeat lipid panel

## 2025-02-05 NOTE — ASSESSMENT & PLAN NOTE
Has history of previously documented PACs.  Calculated heart rate to be approximately low 50s.  Will recommend Holter monitor for short course to identify burden of PACs as on rhythm strip showed to be approximately 30%.  Additionally recommend patient to reestablish with cardiology after completion of Holter monitor

## 2025-02-06 DIAGNOSIS — K21.9 GASTROESOPHAGEAL REFLUX DISEASE, UNSPECIFIED WHETHER ESOPHAGITIS PRESENT: ICD-10-CM

## 2025-02-06 RX ORDER — OMEPRAZOLE 20 MG/1
20 CAPSULE, DELAYED RELEASE ORAL 2 TIMES DAILY
Qty: 180 CAPSULE | Refills: 0 | Status: SHIPPED | OUTPATIENT
Start: 2025-02-06 | End: 2025-08-05

## 2025-02-11 DIAGNOSIS — K21.9 GASTROESOPHAGEAL REFLUX DISEASE, UNSPECIFIED WHETHER ESOPHAGITIS PRESENT: ICD-10-CM

## 2025-02-11 RX ORDER — OMEPRAZOLE 20 MG/1
20 CAPSULE, DELAYED RELEASE ORAL 2 TIMES DAILY
Qty: 180 CAPSULE | Refills: 0 | Status: SHIPPED | OUTPATIENT
Start: 2025-02-11 | End: 2025-08-10

## 2025-02-13 ENCOUNTER — HOSPITAL ENCOUNTER (OUTPATIENT)
Dept: CARDIOLOGY | Facility: HOSPITAL | Age: 65
Discharge: HOME | End: 2025-02-13
Payer: COMMERCIAL

## 2025-02-13 DIAGNOSIS — I49.1 PREMATURE ATRIAL CONTRACTIONS: ICD-10-CM

## 2025-02-13 PROCEDURE — 93225 XTRNL ECG REC<48 HRS REC: CPT

## 2025-02-21 DIAGNOSIS — G47.00 INSOMNIA, UNSPECIFIED TYPE: ICD-10-CM

## 2025-02-21 RX ORDER — TRAZODONE HYDROCHLORIDE 50 MG/1
25-50 TABLET ORAL NIGHTLY PRN
Qty: 90 TABLET | Refills: 1 | Status: SHIPPED | OUTPATIENT
Start: 2025-02-21 | End: 2026-02-21

## 2025-02-25 ENCOUNTER — APPOINTMENT (OUTPATIENT)
Dept: CT IMAGING | Age: 65
End: 2025-02-25
Payer: COMMERCIAL

## 2025-02-25 ENCOUNTER — HOSPITAL ENCOUNTER (EMERGENCY)
Age: 65
Discharge: HOME | End: 2025-02-25
Payer: COMMERCIAL

## 2025-02-25 ENCOUNTER — APPOINTMENT (OUTPATIENT)
Dept: GENERAL RADIOLOGY | Age: 65
End: 2025-02-25
Payer: COMMERCIAL

## 2025-02-25 VITALS
BODY MASS INDEX: 31.2 KG/M2 | HEART RATE: 79 BPM | TEMPERATURE: 97 F | DIASTOLIC BLOOD PRESSURE: 88 MMHG | OXYGEN SATURATION: 99 % | RESPIRATION RATE: 16 BRPM | HEIGHT: 67 IN | SYSTOLIC BLOOD PRESSURE: 149 MMHG | WEIGHT: 198.8 LBS

## 2025-02-25 DIAGNOSIS — S86.912A STRAIN OF LEFT KNEE, INITIAL ENCOUNTER: ICD-10-CM

## 2025-02-25 DIAGNOSIS — M25.562 ACUTE PAIN OF LEFT KNEE: Primary | ICD-10-CM

## 2025-02-25 PROCEDURE — 6360000002 HC RX W HCPCS

## 2025-02-25 PROCEDURE — 73560 X-RAY EXAM OF KNEE 1 OR 2: CPT

## 2025-02-25 PROCEDURE — 73700 CT LOWER EXTREMITY W/O DYE: CPT

## 2025-02-25 PROCEDURE — 99284 EMERGENCY DEPT VISIT MOD MDM: CPT

## 2025-02-25 PROCEDURE — 6370000000 HC RX 637 (ALT 250 FOR IP)

## 2025-02-25 PROCEDURE — 96372 THER/PROPH/DIAG INJ SC/IM: CPT

## 2025-02-25 RX ORDER — KETOROLAC TROMETHAMINE 15 MG/ML
15 INJECTION, SOLUTION INTRAMUSCULAR; INTRAVENOUS ONCE
Status: DISCONTINUED | OUTPATIENT
Start: 2025-02-25 | End: 2025-02-25

## 2025-02-25 RX ORDER — HYDROCODONE BITARTRATE AND ACETAMINOPHEN 5; 325 MG/1; MG/1
1 TABLET ORAL ONCE
Status: COMPLETED | OUTPATIENT
Start: 2025-02-25 | End: 2025-02-25

## 2025-02-25 RX ORDER — KETOROLAC TROMETHAMINE 15 MG/ML
15 INJECTION, SOLUTION INTRAMUSCULAR; INTRAVENOUS ONCE
Status: COMPLETED | OUTPATIENT
Start: 2025-02-25 | End: 2025-02-25

## 2025-02-25 RX ORDER — HYDROCODONE BITARTRATE AND ACETAMINOPHEN 5; 325 MG/1; MG/1
1 TABLET ORAL EVERY 8 HOURS PRN
Qty: 9 TABLET | Refills: 0 | Status: SHIPPED | OUTPATIENT
Start: 2025-02-25 | End: 2025-02-28

## 2025-02-25 RX ADMIN — KETOROLAC TROMETHAMINE 15 MG: 15 INJECTION, SOLUTION INTRAMUSCULAR; INTRAVENOUS at 19:33

## 2025-02-25 RX ADMIN — HYDROCODONE BITARTRATE AND ACETAMINOPHEN 1 TABLET: 5; 325 TABLET ORAL at 19:33

## 2025-02-25 ASSESSMENT — PAIN DESCRIPTION - ORIENTATION: ORIENTATION: LEFT

## 2025-02-25 ASSESSMENT — PAIN DESCRIPTION - PAIN TYPE: TYPE: ACUTE PAIN

## 2025-02-25 ASSESSMENT — LIFESTYLE VARIABLES
HOW OFTEN DO YOU HAVE A DRINK CONTAINING ALCOHOL: NEVER
HOW MANY STANDARD DRINKS CONTAINING ALCOHOL DO YOU HAVE ON A TYPICAL DAY: PATIENT DOES NOT DRINK

## 2025-02-25 ASSESSMENT — PAIN - FUNCTIONAL ASSESSMENT
PAIN_FUNCTIONAL_ASSESSMENT: 0-10
PAIN_FUNCTIONAL_ASSESSMENT: 0-10

## 2025-02-25 ASSESSMENT — PAIN DESCRIPTION - LOCATION: LOCATION: KNEE

## 2025-02-25 ASSESSMENT — PAIN SCALES - GENERAL
PAINLEVEL_OUTOF10: 10
PAINLEVEL_OUTOF10: 5

## 2025-02-25 NOTE — ED PROVIDER NOTES
6:30 PM [facility] EMERGENCY DEPARTMENT  EMERGENCY DEPARTMENT ENCOUNTER      Pt Name: Chris Hernandez  MRN: [id number]  Birthdate 1960  Date of evaluation: 2/25/2025  Provider: Gali Pond MD    CHIEF COMPLAINT       Chief Complaint   Patient presents with    Knee Pain     Left sided states he is unable to bear weight on that leg.          HISTORY OF PRESENT ILLNESS   (Location/Symptom, Timing/Onset, Context/Setting, Quality, Duration, Modifying Factors, Severity)  Note limiting factors.       Chris Hernandez is a 65 y.o. male with a past med history of GERD who presents to the emergency department for chief complaint of progressive left knee pain.  Patient states that his knee has been hurting for the last few weeks, but today while he was in the parking lot, patient states that he felt a pop in his knee, felt his knee lock up and he was barely able to ambulate.  Patient states that he had a coworker had to help to get him to the car.  Patient endorses posterior knee pain at this time.  Patient denies any trauma to the knee.  Patient denies any fevers, weight loss, chills, history of IV drug use, recent bug bites, or falls. Patient denies any other symptoms such as fevers, chills, chest pain, shortness of breath, rigors, confusion, altered mental status, abdominal pain, nausea, vomiting, diarrhea, focal neurological defects, headache lightheadedness or palpitations.    The history is provided by the patient and medical records.       Nursing Notes were reviewed.    REVIEW OF SYSTEMS    (2-9 systems for level 4, 10 or more for level 5)     Review of Systems    Except as noted above the remainder of the review of systems was reviewed and negative.       PAST MEDICAL HISTORY     Past Medical History:   Diagnosis Date    Hx of abdominal surgery      : Appendectomy (age 6)    Hx of vasectomy          SURGICAL HISTORY     No past surgical history on file.      CURRENT MEDICATIONS       Discharge Medication

## 2025-02-26 ENCOUNTER — OFFICE VISIT (OUTPATIENT)
Dept: ORTHOPEDIC SURGERY | Age: 65
End: 2025-02-26
Payer: COMMERCIAL

## 2025-02-26 ENCOUNTER — APPOINTMENT (OUTPATIENT)
Dept: CARDIOLOGY | Facility: CLINIC | Age: 65
End: 2025-02-26
Payer: COMMERCIAL

## 2025-02-26 ENCOUNTER — HOSPITAL ENCOUNTER (OUTPATIENT)
Dept: ULTRASOUND IMAGING | Age: 65
Discharge: HOME | End: 2025-02-28
Attending: ORTHOPAEDIC SURGERY
Payer: COMMERCIAL

## 2025-02-26 VITALS
OXYGEN SATURATION: 99 % | HEIGHT: 67 IN | SYSTOLIC BLOOD PRESSURE: 124 MMHG | BODY MASS INDEX: 31.08 KG/M2 | TEMPERATURE: 97 F | HEART RATE: 76 BPM | WEIGHT: 198 LBS | DIASTOLIC BLOOD PRESSURE: 78 MMHG

## 2025-02-26 DIAGNOSIS — M79.662 PAIN OF LEFT CALF: ICD-10-CM

## 2025-02-26 DIAGNOSIS — E78.2 MIXED HYPERLIPIDEMIA: ICD-10-CM

## 2025-02-26 DIAGNOSIS — M25.562 POSTERIOR LEFT KNEE PAIN: Primary | ICD-10-CM

## 2025-02-26 DIAGNOSIS — R22.42 LOCALIZED SWELLING OF LEFT LOWER LEG: ICD-10-CM

## 2025-02-26 PROCEDURE — 1123F ACP DISCUSS/DSCN MKR DOCD: CPT | Performed by: ORTHOPAEDIC SURGERY

## 2025-02-26 PROCEDURE — 93971 EXTREMITY STUDY: CPT

## 2025-02-26 PROCEDURE — G8417 CALC BMI ABV UP PARAM F/U: HCPCS | Performed by: ORTHOPAEDIC SURGERY

## 2025-02-26 PROCEDURE — G8427 DOCREV CUR MEDS BY ELIG CLIN: HCPCS | Performed by: ORTHOPAEDIC SURGERY

## 2025-02-26 PROCEDURE — 1036F TOBACCO NON-USER: CPT | Performed by: ORTHOPAEDIC SURGERY

## 2025-02-26 PROCEDURE — 3017F COLORECTAL CA SCREEN DOC REV: CPT | Performed by: ORTHOPAEDIC SURGERY

## 2025-02-26 PROCEDURE — 99204 OFFICE O/P NEW MOD 45 MIN: CPT | Performed by: ORTHOPAEDIC SURGERY

## 2025-02-26 RX ORDER — SIMVASTATIN 20 MG/1
20 TABLET, FILM COATED ORAL NIGHTLY
Qty: 90 TABLET | Refills: 1 | Status: SHIPPED | OUTPATIENT
Start: 2025-02-26 | End: 2025-08-25

## 2025-02-26 RX ORDER — NAPROXEN 500 MG/1
500 TABLET ORAL 2 TIMES DAILY WITH MEALS
Qty: 60 TABLET | Refills: 5 | Status: SHIPPED | OUTPATIENT
Start: 2025-02-26

## 2025-02-26 ASSESSMENT — ENCOUNTER SYMPTOMS
COLOR CHANGE: 0
VOMITING: 0
BACK PAIN: 0
COUGH: 0
NAUSEA: 0

## 2025-02-26 NOTE — PROGRESS NOTES
Posterior - negative    Other   Erythema: absent  Sensation: normal  Pulse: present  Swelling: mild    Comments:  Mild swelling present over posterior lateral knee.  No obvious palpable fluid collection however          Physical Exam  Musculoskeletal:         General: Swelling (Mild swelling of left foot and ankle) present.      Left knee:      Instability Tests: Medial Hugo test negative and lateral Hugo test negative.            Laboratory Studies:     Lab Results   Component Value Date    WBC 4.9 05/27/2023    HGB 15.0 05/27/2023    HCT 44.5 05/27/2023    MCV 88.3 05/27/2023     05/27/2023     No results found for: \"SEDRATE\"  No results found for: \"CRP\"    Radiology:     No image results found.      Xray Result (most recent):  XR KNEE LEFT (1-2 VIEWS) 02/25/2025    Narrative  EXAMINATION:  TWO XRAY VIEWS OF THE LEFT KNEE    2/25/2025 5:03 pm    COMPARISON:  None.    HISTORY:  ORDERING SYSTEM PROVIDED HISTORY: injury  TECHNOLOGIST PROVIDED HISTORY:  Reason for exam:->injury  What reading provider will be dictating this exam?->CRC    FINDINGS:  This study is limited to a single frontal projection.    No obvious fractures.    Impression  Limited study due to single frontal projection. No obvious fractures.   and CT Result (most recent):  CT KNEE LEFT WO CONTRAST 02/25/2025    Narrative  EXAMINATION:  CT OF THE LEFT KNEE WITHOUT CONTRAST 2/25/2025 5:40 pm    TECHNIQUE:  CT of the left knee was performed without the administration of intravenous  contrast.  Multiplanar reformatted images are provided for review. Automated  exposure control, iterative reconstruction, and/or weight based adjustment of  the mA/kV was utilized to reduce the radiation dose to as low as reasonably  achievable.    COMPARISON:  Radiographs today    HISTORY  ORDERING SYSTEM PROVIDED HISTORY: pain.  TECHNOLOGIST PROVIDED HISTORY:  Reason for exam:->pain.  Decision Support Exception - unselect if not a suspected or

## 2025-02-27 ENCOUNTER — APPOINTMENT (OUTPATIENT)
Dept: PRIMARY CARE | Facility: CLINIC | Age: 65
End: 2025-02-27
Payer: COMMERCIAL

## 2025-03-04 ENCOUNTER — APPOINTMENT (OUTPATIENT)
Dept: PRIMARY CARE | Facility: CLINIC | Age: 65
End: 2025-03-04
Payer: COMMERCIAL

## 2025-03-04 VITALS
BODY MASS INDEX: 32.3 KG/M2 | HEART RATE: 88 BPM | OXYGEN SATURATION: 97 % | DIASTOLIC BLOOD PRESSURE: 80 MMHG | WEIGHT: 201 LBS | TEMPERATURE: 98.4 F | RESPIRATION RATE: 20 BRPM | SYSTOLIC BLOOD PRESSURE: 136 MMHG | HEIGHT: 66 IN

## 2025-03-04 DIAGNOSIS — E78.2 MIXED HYPERLIPIDEMIA: ICD-10-CM

## 2025-03-04 DIAGNOSIS — S86.912A KNEE STRAIN, LEFT, INITIAL ENCOUNTER: Primary | ICD-10-CM

## 2025-03-04 PROCEDURE — 1159F MED LIST DOCD IN RCRD: CPT

## 2025-03-04 PROCEDURE — 99214 OFFICE O/P EST MOD 30 MIN: CPT

## 2025-03-04 PROCEDURE — 1123F ACP DISCUSS/DSCN MKR DOCD: CPT

## 2025-03-04 PROCEDURE — 3008F BODY MASS INDEX DOCD: CPT

## 2025-03-04 RX ORDER — NAPROXEN 500 MG/1
1 TABLET ORAL
COMMUNITY
Start: 2025-02-26

## 2025-03-04 RX ORDER — PRAVASTATIN SODIUM 20 MG/1
20 TABLET ORAL NIGHTLY
Qty: 30 TABLET | Refills: 0 | Status: SHIPPED | OUTPATIENT
Start: 2025-03-04 | End: 2025-04-03

## 2025-03-04 NOTE — PROGRESS NOTES
"Subjective   Patient ID: Doug Yan Sr. is a 65 y.o. male who presents for Follow-up ([facility] ED follow up for left knee pain. He felt a \"pop\". They checked him for DVT and fracture. Everything was negative. He was felling better. Yesterday he felt the \"pop\" again and he couldn't move again.Area is to the left side of his left knee./He saw Ortho on Wednesday.).    Patient here today to follow-up regarding hyperlipidemia along with recent knee injury.  Patient states that 1 week ago had spontaneously injured knee while walking out to the parking lot from work.  States that he heard popping sound.  Patient has been seen and evaluated by orthopedic surgery.  I completed Doppler ultrasound for DVT concern, negative for any venous thrombosis.  Ortho recommending naproxen along with referral to physical therapy.  Concern for popliteal cyst however no specific comments notable with regard to cystic structure on DVT ultrasound.  Regarding simvastatin, patient states that she had taken this medication for 4 to 5 days, states that it was causing forgetfulness/memory lapses.  States that after he discontinued this medication cognitive concerns were entirely resolved.       Review of Systems    Objective   /80 (BP Location: Left arm, Patient Position: Sitting)   Pulse 88   Temp 36.9 °C (98.4 °F)   Resp 20   Ht 1.676 m (5' 6\")   Wt 91.2 kg (201 lb)   SpO2 97%   BMI 32.44 kg/m²     Physical Exam  Constitutional:       General: He is not in acute distress.     Appearance: Normal appearance. He is not ill-appearing.   Eyes:      General: No scleral icterus.  Cardiovascular:      Rate and Rhythm: Normal rate and regular rhythm.      Heart sounds: No murmur heard.     No friction rub. No gallop.   Pulmonary:      Effort: Pulmonary effort is normal. No respiratory distress.      Breath sounds: Normal breath sounds. No wheezing, rhonchi or rales.   Musculoskeletal:      Right lower leg: No edema.      Left lower leg: No " edema.      Comments: Mild swelling notable of the left knee, does have some joint line tenderness as well, particular over left lateral area.  Range of motion largely intact, no appreciable booking or other joint instability notable.   Neurological:      General: No focal deficit present.      Mental Status: He is alert and oriented to person, place, and time.   Psychiatric:         Mood and Affect: Mood normal.         Behavior: Behavior normal.         Assessment/Plan   Problem List Items Addressed This Visit             ICD-10-CM    Hyperlipidemia E78.5    Relevant Medications    pravastatin (Pravachol) 20 mg tablet    Other Relevant Orders    Lipid panel     Other Visit Diagnoses         Codes    Knee strain, left, initial encounter    -  Primary S86.912A    Relevant Orders    Referral to Physical Therapy        Given significantly elevated ASCVD risk we will attempt to transition to other statin as previously intolerant to simvastatin: Memory issues.  Advised patient to continue to monitor for adverse effects and contact office should he develop any.  Will refer to physical therapy at this time, patient previously worked with PT and requesting referral to this specific individual.  Placed today, patient to reestablish as soon as he is able.  Reviewed OSH orthopedic chart along with associated imaging.  No remark on ultrasound regarding Baker's cyst.  Negative for DVT.  Agree with orthopedic suggestion and will order PT.  Patient to follow-up after completion of physical therapy, consider MRI.    Jorge Martinez, DO

## 2025-03-18 DIAGNOSIS — E78.2 MIXED HYPERLIPIDEMIA: ICD-10-CM

## 2025-03-18 RX ORDER — PRAVASTATIN SODIUM 20 MG/1
20 TABLET ORAL NIGHTLY
Qty: 30 TABLET | Refills: 0 | Status: SHIPPED | OUTPATIENT
Start: 2025-03-18 | End: 2025-04-17

## 2025-07-28 DIAGNOSIS — G47.00 INSOMNIA, UNSPECIFIED TYPE: ICD-10-CM

## 2025-07-29 RX ORDER — TRAZODONE HYDROCHLORIDE 50 MG/1
25-50 TABLET ORAL NIGHTLY PRN
Qty: 90 TABLET | Refills: 1 | Status: SHIPPED | OUTPATIENT
Start: 2025-07-29 | End: 2026-07-29